# Patient Record
Sex: FEMALE | Race: WHITE | NOT HISPANIC OR LATINO | Employment: OTHER | URBAN - METROPOLITAN AREA
[De-identification: names, ages, dates, MRNs, and addresses within clinical notes are randomized per-mention and may not be internally consistent; named-entity substitution may affect disease eponyms.]

---

## 2018-09-13 ENCOUNTER — HOSPITAL ENCOUNTER (EMERGENCY)
Facility: HOSPITAL | Age: 53
Discharge: HOME | End: 2018-09-13
Attending: EMERGENCY MEDICINE | Admitting: EMERGENCY MEDICINE

## 2018-09-13 ENCOUNTER — APPOINTMENT (OUTPATIENT)
Dept: GENERAL RADIOLOGY | Facility: HOSPITAL | Age: 53
End: 2018-09-13

## 2018-09-13 VITALS
DIASTOLIC BLOOD PRESSURE: 60 MMHG | RESPIRATION RATE: 18 BRPM | OXYGEN SATURATION: 94 % | WEIGHT: 173 LBS | HEART RATE: 72 BPM | TEMPERATURE: 98.3 F | SYSTOLIC BLOOD PRESSURE: 120 MMHG | HEIGHT: 65 IN | BODY MASS INDEX: 28.82 KG/M2

## 2018-09-13 DIAGNOSIS — R74.01 ELEVATED TRANSAMINASE LEVEL: ICD-10-CM

## 2018-09-13 DIAGNOSIS — R07.89 CHEST DISCOMFORT: ICD-10-CM

## 2018-09-13 DIAGNOSIS — R19.7 NAUSEA VOMITING AND DIARRHEA: Primary | ICD-10-CM

## 2018-09-13 DIAGNOSIS — R11.2 NAUSEA VOMITING AND DIARRHEA: Primary | ICD-10-CM

## 2018-09-13 DIAGNOSIS — D75.1 POLYCYTHEMIA: ICD-10-CM

## 2018-09-13 LAB
ALBUMIN SERPL-MCNC: 4.58 G/DL (ref 3.2–4.8)
ALBUMIN/GLOB SERPL: 2 G/DL (ref 1.5–2.5)
ALP SERPL-CCNC: 90 U/L (ref 25–100)
ALT SERPL W P-5'-P-CCNC: 37 U/L (ref 7–40)
ANION GAP SERPL CALCULATED.3IONS-SCNC: 6 MMOL/L (ref 3–11)
AST SERPL-CCNC: 37 U/L (ref 0–33)
BASOPHILS # BLD AUTO: 0.01 10*3/MM3 (ref 0–0.2)
BASOPHILS NFR BLD AUTO: 0.2 % (ref 0–1)
BILIRUB SERPL-MCNC: 0.3 MG/DL (ref 0.3–1.2)
BNP SERPL-MCNC: 12 PG/ML (ref 0–100)
BUN BLD-MCNC: 10 MG/DL (ref 9–23)
BUN/CREAT SERPL: 11.6 (ref 7–25)
CALCIUM SPEC-SCNC: 9.1 MG/DL (ref 8.7–10.4)
CHLORIDE SERPL-SCNC: 102 MMOL/L (ref 99–109)
CO2 SERPL-SCNC: 26 MMOL/L (ref 20–31)
CREAT BLD-MCNC: 0.86 MG/DL (ref 0.6–1.3)
DEPRECATED RDW RBC AUTO: 50 FL (ref 37–54)
EOSINOPHIL # BLD AUTO: 0.1 10*3/MM3 (ref 0–0.3)
EOSINOPHIL NFR BLD AUTO: 1.6 % (ref 0–3)
ERYTHROCYTE [DISTWIDTH] IN BLOOD BY AUTOMATED COUNT: 13.8 % (ref 11.3–14.5)
GFR SERPL CREATININE-BSD FRML MDRD: 69 ML/MIN/1.73
GLOBULIN UR ELPH-MCNC: 2.3 GM/DL
GLUCOSE BLD-MCNC: 107 MG/DL (ref 70–100)
HCT VFR BLD AUTO: 49.1 % (ref 34.5–44)
HGB BLD-MCNC: 17 G/DL (ref 11.5–15.5)
HOLD SPECIMEN: NORMAL
HOLD SPECIMEN: NORMAL
IMM GRANULOCYTES # BLD: 0.01 10*3/MM3 (ref 0–0.03)
IMM GRANULOCYTES NFR BLD: 0.2 % (ref 0–0.6)
LIPASE SERPL-CCNC: 40 U/L (ref 6–51)
LYMPHOCYTES # BLD AUTO: 2.1 10*3/MM3 (ref 0.6–4.8)
LYMPHOCYTES NFR BLD AUTO: 33.3 % (ref 24–44)
MCH RBC QN AUTO: 34.4 PG (ref 27–31)
MCHC RBC AUTO-ENTMCNC: 34.6 G/DL (ref 32–36)
MCV RBC AUTO: 99.4 FL (ref 80–99)
MONOCYTES # BLD AUTO: 0.69 10*3/MM3 (ref 0–1)
MONOCYTES NFR BLD AUTO: 11 % (ref 0–12)
NEUTROPHILS # BLD AUTO: 3.4 10*3/MM3 (ref 1.5–8.3)
NEUTROPHILS NFR BLD AUTO: 53.9 % (ref 41–71)
PLATELET # BLD AUTO: 218 10*3/MM3 (ref 150–450)
PMV BLD AUTO: 10.4 FL (ref 6–12)
POTASSIUM BLD-SCNC: 3.9 MMOL/L (ref 3.5–5.5)
PROT SERPL-MCNC: 6.9 G/DL (ref 5.7–8.2)
RBC # BLD AUTO: 4.94 10*6/MM3 (ref 3.89–5.14)
SODIUM BLD-SCNC: 134 MMOL/L (ref 132–146)
TROPONIN I SERPL-MCNC: 0 NG/ML (ref 0–0.07)
WBC NRBC COR # BLD: 6.3 10*3/MM3 (ref 3.5–10.8)
WHOLE BLOOD HOLD SPECIMEN: NORMAL
WHOLE BLOOD HOLD SPECIMEN: NORMAL

## 2018-09-13 PROCEDURE — 71045 X-RAY EXAM CHEST 1 VIEW: CPT

## 2018-09-13 PROCEDURE — 85025 COMPLETE CBC W/AUTO DIFF WBC: CPT

## 2018-09-13 PROCEDURE — 83690 ASSAY OF LIPASE: CPT | Performed by: EMERGENCY MEDICINE

## 2018-09-13 PROCEDURE — 25010000002 ONDANSETRON PER 1 MG: Performed by: EMERGENCY MEDICINE

## 2018-09-13 PROCEDURE — 80053 COMPREHEN METABOLIC PANEL: CPT | Performed by: EMERGENCY MEDICINE

## 2018-09-13 PROCEDURE — 84484 ASSAY OF TROPONIN QUANT: CPT

## 2018-09-13 PROCEDURE — 96361 HYDRATE IV INFUSION ADD-ON: CPT

## 2018-09-13 PROCEDURE — 96374 THER/PROPH/DIAG INJ IV PUSH: CPT

## 2018-09-13 PROCEDURE — 93005 ELECTROCARDIOGRAM TRACING: CPT

## 2018-09-13 PROCEDURE — 93005 ELECTROCARDIOGRAM TRACING: CPT | Performed by: EMERGENCY MEDICINE

## 2018-09-13 PROCEDURE — 83880 ASSAY OF NATRIURETIC PEPTIDE: CPT

## 2018-09-13 PROCEDURE — 99284 EMERGENCY DEPT VISIT MOD MDM: CPT

## 2018-09-13 RX ORDER — ASPIRIN 81 MG/1
324 TABLET, CHEWABLE ORAL ONCE
Status: DISCONTINUED | OUTPATIENT
Start: 2018-09-13 | End: 2018-09-13

## 2018-09-13 RX ORDER — ATORVASTATIN CALCIUM 10 MG/1
10 TABLET, FILM COATED ORAL DAILY
COMMUNITY

## 2018-09-13 RX ORDER — SODIUM CHLORIDE 0.9 % (FLUSH) 0.9 %
10 SYRINGE (ML) INJECTION AS NEEDED
Status: DISCONTINUED | OUTPATIENT
Start: 2018-09-13 | End: 2018-09-13 | Stop reason: HOSPADM

## 2018-09-13 RX ORDER — ONDANSETRON 4 MG/1
4 TABLET, ORALLY DISINTEGRATING ORAL EVERY 8 HOURS PRN
Qty: 15 TABLET | Refills: 0 | Status: SHIPPED | OUTPATIENT
Start: 2018-09-13

## 2018-09-13 RX ORDER — ESCITALOPRAM OXALATE 20 MG/1
20 TABLET ORAL DAILY
COMMUNITY

## 2018-09-13 RX ORDER — ONDANSETRON 2 MG/ML
4 INJECTION INTRAMUSCULAR; INTRAVENOUS ONCE
Status: COMPLETED | OUTPATIENT
Start: 2018-09-13 | End: 2018-09-13

## 2018-09-13 RX ADMIN — SODIUM CHLORIDE 1000 ML: 9 INJECTION, SOLUTION INTRAVENOUS at 16:39

## 2018-09-13 RX ADMIN — ONDANSETRON 4 MG: 2 INJECTION INTRAMUSCULAR; INTRAVENOUS at 16:40

## 2018-09-13 NOTE — ED PROVIDER NOTES
Josselin Armstrong is a 53 y.o.female who presents to the ED with complaints of chest pain. The patient reports her symptoms began with intermittent nausea and vomiting 5 days ago. Today, she developed chest pain so she went to the [facility] to be evaluated. During her visit at the [facility], it was discovered that she had hypertension so she was sent here for further evaluation. She describes the discomfort in her chest as a constant tightness. She has taken OTC medications, but she has not experienced any relief. She also complains of a cough, diarrhea, shortness of breath, congestion, and a rash on her bilateral knees, but she denies any abdominal pain or headache. She was admitted in 2014 for a small bowel obstruction and intestinal ischemia. She denies any recent sick contacts. She has a history of tobacco abuse, HTD, and depression. She experienced a normal stress test about 2 years ago. There are no other complaints at this time.         History provided by:  Patient, spouse and relative  Chest Pain   Pain location:  L chest  Pain quality: tightness    Pain radiates to:  Does not radiate  Pain severity:  Mild  Onset quality:  Sudden  Duration: chest pain developed today, nausea and vomiting developed 5 days ago.  Timing:  Constant  Progression:  Unchanged  Chronicity:  New  Relieved by:  None tried  Worsened by:  Nothing  Ineffective treatments: OTC medications.  Associated symptoms: cough, nausea, shortness of breath and vomiting    Associated symptoms: no abdominal pain and no headache    Risk factors: high cholesterol    Risk factors: not male        Review of Systems   HENT: Positive for congestion.    Respiratory: Positive for cough and shortness of breath.    Cardiovascular: Positive for chest pain.   Gastrointestinal: Positive for diarrhea, nausea and vomiting. Negative for abdominal pain.   Skin: Positive for rash (bilateral knees).   Neurological: Negative for headaches.   All other systems reviewed and  are negative.      Past Medical History:   Diagnosis Date   • Depression    • Hyperlipidemia        No Known Allergies    Past Surgical History:   Procedure Laterality Date   • ABDOMINAL SURGERY     • TONSILLECTOMY         Family History   Problem Relation Age of Onset   • Breast cancer Mother 65   • Ovarian cancer Neg Hx        Social History     Social History   • Marital status:      Social History Main Topics   • Smoking status: Current Every Day Smoker     Packs/day: 1.00     Years: 10.00     Types: Cigarettes   • Alcohol use Yes      Comment: 2 drinks/day, wine, bourbon, vodka   • Drug use: No     Other Topics Concern   • Not on file         Objective   Physical Exam   Constitutional: She is oriented to person, place, and time. She appears well-developed and well-nourished. No distress.   HENT:   Head: Normocephalic and atraumatic.   Right Ear: External ear normal.   Nose: Nose normal.   Dry lips. Fluid behind the left TM with no signs of infection.    Eyes: Pupils are equal, round, and reactive to light. Conjunctivae and EOM are normal. No scleral icterus.   Neck: Normal range of motion. Neck supple.   Cardiovascular: Normal rate, regular rhythm and normal heart sounds.    No murmur heard.  Pulmonary/Chest: Effort normal and breath sounds normal. No respiratory distress.   Abdominal: Soft. Bowel sounds are normal. There is no tenderness.   Musculoskeletal: Normal range of motion. She exhibits no edema.   Lymphadenopathy:     She has no cervical adenopathy.   Neurological: She is alert and oriented to person, place, and time.   Skin: Skin is warm and dry. Rash noted.   Appears to be livedo reticularis on her knees bilaterally.    Psychiatric: She has a normal mood and affect. Her behavior is normal.   Nursing note and vitals reviewed.      Procedures         ED Course  ED Course as of Sep 14 0123   Thu Sep 13, 2018   1719 Dr. Childs is bedside reevaluating the patient and discussing the treatment  plan.   [TB]      ED Course User Index  [TB] Jorge L Craft                     [facility]  Number of Diagnoses or Management Options  Chest discomfort:   Elevated transaminase level:   Nausea vomiting and diarrhea:   Polycythemia:   Diagnosis management comments:       Reviewed all available studies the bedside with the patient and her family.  They are reassuring.  She is some mild laboratory abnormalities including mild polycythemia.  She is a smoker I've encouraged her to quit smoking.  Her glucose is slightly elevated as is one of her transaminases.  On recheck her abdomen is benign.  I postulated this may be a viral illness.  Talked to the patient about dietary issues.  She's had a stress test about 2 years ago that per her report was normal think her risk therefore of this being anything coronary artery related is low.  Encourage follow-up with her primary care doctor early next week to discuss this visit for recheck.  She'll return to the ER if worse in any way.  Wrote her some Zofran to take for nausea.    All are agreeable with the plan       Amount and/or Complexity of Data Reviewed  Clinical lab tests: reviewed  Tests in the radiology section of CPT®: reviewed  Tests in the medicine section of CPT®: reviewed        Final diagnoses:   Nausea vomiting and diarrhea   Chest discomfort   Polycythemia   Elevated transaminase level       Documentation assistance provided by margareth Craft.  Information recorded by the margareth was done at my direction and has been verified and validated by me.     Jorge L Craft  09/13/18 1708       Jorge L Craft  09/13/18 2050       Jay Childs MD  09/14/18 [id number]

## 2022-03-02 ENCOUNTER — TRANSCRIBE ORDERS (OUTPATIENT)
Dept: ADMINISTRATIVE | Facility: HOSPITAL | Age: 57
End: 2022-03-02

## 2022-03-02 DIAGNOSIS — Z12.31 VISIT FOR SCREENING MAMMOGRAM: Primary | ICD-10-CM

## 2022-04-05 ENCOUNTER — HOSPITAL ENCOUNTER (OUTPATIENT)
Dept: MAMMOGRAPHY | Facility: HOSPITAL | Age: 57
Discharge: HOME | End: 2022-04-05
Admitting: FAMILY MEDICINE

## 2022-04-05 DIAGNOSIS — Z12.31 VISIT FOR SCREENING MAMMOGRAM: ICD-10-CM

## 2022-04-05 PROCEDURE — 77063 BREAST TOMOSYNTHESIS BI: CPT | Performed by: RADIOLOGY

## 2022-04-05 PROCEDURE — 77067 SCR MAMMO BI INCL CAD: CPT

## 2022-04-05 PROCEDURE — 77063 BREAST TOMOSYNTHESIS BI: CPT

## 2022-04-05 PROCEDURE — 77067 SCR MAMMO BI INCL CAD: CPT | Performed by: RADIOLOGY

## 2023-04-18 ENCOUNTER — HOSPITAL ENCOUNTER (OUTPATIENT)
Facility: HOSPITAL | Age: 58
Setting detail: OBSERVATION
Discharge: HOME | End: 2023-04-21
Attending: EMERGENCY MEDICINE | Admitting: HOSPITALIST
Payer: COMMERCIAL

## 2023-04-18 ENCOUNTER — APPOINTMENT (OUTPATIENT)
Dept: ULTRASOUND IMAGING | Facility: HOSPITAL | Age: 58
End: 2023-04-18
Payer: COMMERCIAL

## 2023-04-18 ENCOUNTER — APPOINTMENT (OUTPATIENT)
Dept: CT IMAGING | Facility: HOSPITAL | Age: 58
End: 2023-04-18
Payer: COMMERCIAL

## 2023-04-18 ENCOUNTER — APPOINTMENT (OUTPATIENT)
Dept: GENERAL RADIOLOGY | Facility: HOSPITAL | Age: 58
End: 2023-04-18
Payer: COMMERCIAL

## 2023-04-18 DIAGNOSIS — R20.0 NUMBNESS AND TINGLING OF RIGHT UPPER EXTREMITY: Primary | ICD-10-CM

## 2023-04-18 DIAGNOSIS — I10 HYPERTENSION, UNSPECIFIED TYPE: ICD-10-CM

## 2023-04-18 DIAGNOSIS — E87.6 HYPOKALEMIA: ICD-10-CM

## 2023-04-18 DIAGNOSIS — R20.2 NUMBNESS AND TINGLING OF RIGHT UPPER EXTREMITY: Primary | ICD-10-CM

## 2023-04-18 PROBLEM — R03.0 ELEVATED BLOOD-PRESSURE READING WITHOUT DIAGNOSIS OF HYPERTENSION: Status: ACTIVE | Noted: 2023-04-18

## 2023-04-18 PROBLEM — K21.9 GERD (GASTROESOPHAGEAL REFLUX DISEASE): Status: ACTIVE | Noted: 2023-04-18

## 2023-04-18 PROBLEM — R79.89 ELEVATED LFTS: Status: ACTIVE | Noted: 2023-04-18

## 2023-04-18 PROBLEM — E78.5 HLD (HYPERLIPIDEMIA): Status: ACTIVE | Noted: 2023-04-18

## 2023-04-18 LAB
ALBUMIN SERPL-MCNC: 3.9 G/DL (ref 3.5–5.2)
ALBUMIN/GLOB SERPL: 1.2 G/DL
ALP SERPL-CCNC: 213 U/L (ref 39–117)
ALT SERPL W P-5'-P-CCNC: 107 U/L (ref 1–33)
ANION GAP SERPL CALCULATED.3IONS-SCNC: 19 MMOL/L (ref 5–15)
APTT PPP: 20 SECONDS (ref 22–39)
AST SERPL-CCNC: 124 U/L (ref 1–32)
BASOPHILS # BLD AUTO: 0.06 10*3/MM3 (ref 0–0.2)
BASOPHILS NFR BLD AUTO: 0.6 % (ref 0–1.5)
BILIRUB SERPL-MCNC: 0.6 MG/DL (ref 0–1.2)
BUN BLDA-MCNC: 7 MG/DL (ref 8–26)
BUN SERPL-MCNC: 7 MG/DL (ref 6–20)
BUN/CREAT SERPL: 9.2 (ref 7–25)
CA-I BLDA-SCNC: 1.06 MMOL/L (ref 1.2–1.32)
CALCIUM SPEC-SCNC: 8.7 MG/DL (ref 8.6–10.5)
CHLORIDE BLDA-SCNC: 94 MMOL/L (ref 98–109)
CHLORIDE SERPL-SCNC: 96 MMOL/L (ref 98–107)
CO2 BLDA-SCNC: 32 MMOL/L (ref 24–29)
CO2 SERPL-SCNC: 27 MMOL/L (ref 22–29)
CREAT BLDA-MCNC: 0.7 MG/DL (ref 0.6–1.3)
CREAT SERPL-MCNC: 0.76 MG/DL (ref 0.57–1)
DEPRECATED RDW RBC AUTO: 51.9 FL (ref 37–54)
EGFRCR SERPLBLD CKD-EPI 2021: 101 ML/MIN/1.73
EGFRCR SERPLBLD CKD-EPI 2021: 91.5 ML/MIN/1.73
EOSINOPHIL # BLD AUTO: 0.13 10*3/MM3 (ref 0–0.4)
EOSINOPHIL NFR BLD AUTO: 1.3 % (ref 0.3–6.2)
ERYTHROCYTE [DISTWIDTH] IN BLOOD BY AUTOMATED COUNT: 14.3 % (ref 12.3–15.4)
GLOBULIN UR ELPH-MCNC: 3.3 GM/DL
GLUCOSE BLDC GLUCOMTR-MCNC: 126 MG/DL (ref 70–130)
GLUCOSE SERPL-MCNC: 125 MG/DL (ref 65–99)
HAV IGM SERPL QL IA: NORMAL
HBV CORE IGM SERPL QL IA: NORMAL
HBV SURFACE AG SERPL QL IA: NORMAL
HCT VFR BLD AUTO: 43.5 % (ref 34–46.6)
HCT VFR BLDA CALC: 45 % (ref 38–51)
HCV AB SER DONR QL: NORMAL
HGB BLD-MCNC: 14.6 G/DL (ref 12–15.9)
HGB BLDA-MCNC: 15.3 G/DL (ref 12–17)
HOLD SPECIMEN: NORMAL
IMM GRANULOCYTES # BLD AUTO: 0.04 10*3/MM3 (ref 0–0.05)
IMM GRANULOCYTES NFR BLD AUTO: 0.4 % (ref 0–0.5)
INR PPP: 1.1 (ref 0.8–1.2)
LIPASE SERPL-CCNC: 24 U/L (ref 13–60)
LYMPHOCYTES # BLD AUTO: 1.93 10*3/MM3 (ref 0.7–3.1)
LYMPHOCYTES NFR BLD AUTO: 19.6 % (ref 19.6–45.3)
MAGNESIUM SERPL-MCNC: 1.8 MG/DL (ref 1.6–2.6)
MCH RBC QN AUTO: 33.2 PG (ref 26.6–33)
MCHC RBC AUTO-ENTMCNC: 33.6 G/DL (ref 31.5–35.7)
MCV RBC AUTO: 98.9 FL (ref 79–97)
MONOCYTES # BLD AUTO: 0.81 10*3/MM3 (ref 0.1–0.9)
MONOCYTES NFR BLD AUTO: 8.2 % (ref 5–12)
NEUTROPHILS NFR BLD AUTO: 6.86 10*3/MM3 (ref 1.7–7)
NEUTROPHILS NFR BLD AUTO: 69.9 % (ref 42.7–76)
NRBC BLD AUTO-RTO: 0 /100 WBC (ref 0–0.2)
PLATELET # BLD AUTO: 329 10*3/MM3 (ref 140–450)
PMV BLD AUTO: 9.5 FL (ref 6–12)
POTASSIUM BLDA-SCNC: 3 MMOL/L (ref 3.5–4.9)
POTASSIUM SERPL-SCNC: 3.3 MMOL/L (ref 3.5–5.2)
PROT SERPL-MCNC: 7.2 G/DL (ref 6–8.5)
PROTHROMBIN TIME: 13.4 SECONDS (ref 12.8–15.2)
QT INTERVAL: 474 MS
QTC INTERVAL: 560 MS
RBC # BLD AUTO: 4.4 10*6/MM3 (ref 3.77–5.28)
SODIUM BLD-SCNC: 138 MMOL/L (ref 138–146)
SODIUM SERPL-SCNC: 142 MMOL/L (ref 136–145)
TROPONIN T SERPL HS-MCNC: <6 NG/L
WBC NRBC COR # BLD: 9.83 10*3/MM3 (ref 3.4–10.8)
WHOLE BLOOD HOLD COAG: NORMAL
WHOLE BLOOD HOLD SPECIMEN: NORMAL

## 2023-04-18 PROCEDURE — 99285 EMERGENCY DEPT VISIT HI MDM: CPT

## 2023-04-18 PROCEDURE — 85610 PROTHROMBIN TIME: CPT

## 2023-04-18 PROCEDURE — 83690 ASSAY OF LIPASE: CPT | Performed by: NURSE PRACTITIONER

## 2023-04-18 PROCEDURE — 85025 COMPLETE CBC W/AUTO DIFF WBC: CPT | Performed by: EMERGENCY MEDICINE

## 2023-04-18 PROCEDURE — 99222 1ST HOSP IP/OBS MODERATE 55: CPT | Performed by: NURSE PRACTITIONER

## 2023-04-18 PROCEDURE — 80074 ACUTE HEPATITIS PANEL: CPT | Performed by: NURSE PRACTITIONER

## 2023-04-18 PROCEDURE — G0378 HOSPITAL OBSERVATION PER HR: HCPCS

## 2023-04-18 PROCEDURE — 80047 BASIC METABLC PNL IONIZED CA: CPT

## 2023-04-18 PROCEDURE — 85730 THROMBOPLASTIN TIME PARTIAL: CPT | Performed by: EMERGENCY MEDICINE

## 2023-04-18 PROCEDURE — 25510000001 IOPAMIDOL PER 1 ML: Performed by: EMERGENCY MEDICINE

## 2023-04-18 PROCEDURE — 84484 ASSAY OF TROPONIN QUANT: CPT | Performed by: EMERGENCY MEDICINE

## 2023-04-18 PROCEDURE — 70450 CT HEAD/BRAIN W/O DYE: CPT

## 2023-04-18 PROCEDURE — 99223 1ST HOSP IP/OBS HIGH 75: CPT | Performed by: NURSE PRACTITIONER

## 2023-04-18 PROCEDURE — 36415 COLL VENOUS BLD VENIPUNCTURE: CPT

## 2023-04-18 PROCEDURE — 85014 HEMATOCRIT: CPT

## 2023-04-18 PROCEDURE — 70498 CT ANGIOGRAPHY NECK: CPT

## 2023-04-18 PROCEDURE — 0042T HC CT CEREBRAL PERFUSION W/WO CONTRAST: CPT

## 2023-04-18 PROCEDURE — 80053 COMPREHEN METABOLIC PANEL: CPT | Performed by: NURSE PRACTITIONER

## 2023-04-18 PROCEDURE — 70496 CT ANGIOGRAPHY HEAD: CPT

## 2023-04-18 PROCEDURE — 93005 ELECTROCARDIOGRAM TRACING: CPT | Performed by: EMERGENCY MEDICINE

## 2023-04-18 PROCEDURE — 71045 X-RAY EXAM CHEST 1 VIEW: CPT

## 2023-04-18 PROCEDURE — 83735 ASSAY OF MAGNESIUM: CPT | Performed by: EMERGENCY MEDICINE

## 2023-04-18 RX ORDER — ASPIRIN 325 MG
325 TABLET ORAL DAILY
Status: DISCONTINUED | OUTPATIENT
Start: 2023-04-18 | End: 2023-04-20

## 2023-04-18 RX ORDER — SODIUM CHLORIDE 0.9 % (FLUSH) 0.9 %
10 SYRINGE (ML) INJECTION AS NEEDED
Status: DISCONTINUED | OUTPATIENT
Start: 2023-04-18 | End: 2023-04-19

## 2023-04-18 RX ORDER — POTASSIUM CHLORIDE 750 MG/1
40 CAPSULE, EXTENDED RELEASE ORAL ONCE
Status: COMPLETED | OUTPATIENT
Start: 2023-04-18 | End: 2023-04-18

## 2023-04-18 RX ORDER — ESOMEPRAZOLE MAGNESIUM 40 MG/1
40 CAPSULE, DELAYED RELEASE ORAL
COMMUNITY

## 2023-04-18 RX ORDER — ATORVASTATIN CALCIUM 40 MG/1
80 TABLET, FILM COATED ORAL NIGHTLY
Status: CANCELLED | OUTPATIENT
Start: 2023-04-18

## 2023-04-18 RX ORDER — CLONIDINE HYDROCHLORIDE 0.1 MG/1
0.1 TABLET ORAL ONCE
Status: COMPLETED | OUTPATIENT
Start: 2023-04-18 | End: 2023-04-18

## 2023-04-18 RX ORDER — ASPIRIN 300 MG/1
300 SUPPOSITORY RECTAL DAILY
Status: DISCONTINUED | OUTPATIENT
Start: 2023-04-18 | End: 2023-04-20

## 2023-04-18 RX ADMIN — POTASSIUM CHLORIDE 40 MEQ: 750 CAPSULE, EXTENDED RELEASE ORAL at 16:16

## 2023-04-18 RX ADMIN — IOPAMIDOL 115 ML: 755 INJECTION, SOLUTION INTRAVENOUS at 15:18

## 2023-04-18 RX ADMIN — ASPIRIN 325 MG: 325 TABLET ORAL at 16:16

## 2023-04-18 RX ADMIN — CLONIDINE HYDROCHLORIDE 0.1 MG: 0.1 TABLET ORAL at 15:33

## 2023-04-18 NOTE — CONSULTS
"Stroke Consult Note    Patient Name: Radha Armstrong   MRN: [id number]  Age: 57 y.o.  Sex: female  : 1965    Primary Care Physician: Franci Vaughn MD  Referring Physician: Dr. Flanagan  TIME STROKE TEAM CALLED: 1450 EST     TIME PATIENT SEEN: 1500 EST    Handedness: Right   Race:     Chief Complaint/Reason for Consultation: Transient head pressure, right hand numbness, \"all over shaking\", and coordination difficulties    HPI: Radha Armstrong is a 57-year-old female with a PMH significant for HLD, depression, former tobacco abuse, and anxiety who presents to [facility] ED with complaints of episodes of transient head pressure, right hand numbness, \"all over shaking\", and coordination difficulties that have been occurring off and on for the past 2 weeks.  Patient reports that she noted symptoms when she woke this morning at 0800.  Her last known well was 2200 last night.  Symptoms have currently improved.  Continues to have mild numbness in her right hand.  Patient reports that episodes occur when changing positions.  When standing, she states that she has extreme head pressure followed by shaking all over and difficulty with coordination.  This has happened daily for the last 2 weeks.  She has not sought any medical care.  Her family reports that she had a syncopal episode with fall.  She had no evidence of see with this event.      Her BP is noted to be elevated today at 196/92.  Patient reports no known history of hypertension. GCS is 15.  NIH is 1.  Mild tremor noted when lifting her LLE.  CT head negative for any acute abnormalities.  CTP with normal brain perfusion.CTA H/N with no flow-limiting stenosis, occlusion, or aneurysm noted.      Last Known Normal Date/Time: Symptoms currently resolved    Review of Systems   Constitutional: Negative for chills and fever.   HENT: Negative for congestion and trouble swallowing.    Eyes: Negative for photophobia and visual disturbance.   Respiratory: Negative for cough " and shortness of breath.    Cardiovascular: Negative for chest pain and palpitations.   Gastrointestinal: Negative for diarrhea, nausea and vomiting.   Musculoskeletal: Positive for gait problem.   Neurological: Positive for tremors, numbness and headaches.      Past Medical History:   Diagnosis Date   • Depression    • Hyperlipidemia      Past Surgical History:   Procedure Laterality Date   • ABDOMINAL SURGERY     • TONSILLECTOMY       Family History   Problem Relation Age of Onset   • Breast cancer Mother 65   • Ovarian cancer Neg Hx      Social History     Socioeconomic History   • Marital status:    Tobacco Use   • Smoking status: Every Day     Packs/day: 1.00     Years: 10.00     Pack years: 10.00     Types: Cigarettes   Substance and Sexual Activity   • Alcohol use: Yes     Comment: 2 drinks/day, wine, bourbon, vodka   • Drug use: No     No Known Allergies  Prior to Admission medications    Medication Sig Start Date End Date Taking? Authorizing Provider   atorvastatin (LIPITOR) 10 MG tablet Take 10 mg by mouth Daily.    Provider, MD Tyrell   escitalopram (LEXAPRO) 20 MG tablet Take 20 mg by mouth Daily.    Provider, MD Tyrell   ondansetron ODT (ZOFRAN-ODT) 4 MG disintegrating tablet Take 1 tablet by mouth Every 8 (Eight) Hours As Needed for Nausea or Vomiting. 9/13/18   Jay Childs MD         Temp:  [98.3 °F (36.8 °C)] 98.3 °F (36.8 °C)  Heart Rate:  [95] 95  Resp:  [18] 18  BP: (196)/(92) 196/92  Neurological Exam  Mental Status  Alert. Oriented to person, place, time and situation. Oriented to person, place, and time. Speech is normal. Language is fluent with no aphasia.    Cranial Nerves  CN II: Visual acuity is normal. Visual fields full to confrontation.  CN III, IV, VI: Extraocular movements intact bilaterally. Normal lids and orbits bilaterally. Pupils equal round and reactive to light bilaterally.  CN V: Facial sensation is normal.  CN VII: Full and symmetric facial movement.  CN  IX, X: Palate elevates symmetrically  CN XI: Shoulder shrug strength is normal.  CN XII: Tongue midline without atrophy or fasciculations.    Motor   Strength is 5/5 throughout all four extremities.  Slight tremor were noted when lifting LLE.    Sensory  Light touch abnormality: Right hand.     Reflexes                                            Right                      Left  Patellar                                2+                         2+  Plantar                           Downgoing                Downgoing    Coordination  Right: Finger-to-nose normal. Heel-to-shin normal.Left: Finger-to-nose normal.    Gait   Normal gait.      Physical Exam  Constitutional:       Appearance: She is obese.   HENT:      Head: Normocephalic and atraumatic.   Eyes:      General: Lids are normal.      Extraocular Movements: Extraocular movements intact.      Pupils: Pupils are equal, round, and reactive to light.   Cardiovascular:      Rate and Rhythm: Normal rate and regular rhythm.   Pulmonary:      Effort: Pulmonary effort is normal. No respiratory distress.   Abdominal:      General: There is no distension.      Palpations: Abdomen is soft.   Musculoskeletal:         General: Normal range of motion.      Cervical back: Normal range of motion and neck supple.   Skin:     General: Skin is warm and dry.      Capillary Refill: Capillary refill takes less than 2 seconds.   Neurological:      Mental Status: She is alert and oriented to person, place, and time.      Cranial Nerves: No cranial nerve deficit.      Sensory: No sensory deficit.      Motor: Motor strength is normal. No weakness.      Coordination: Coordination normal.      Gait: Gait normal.      Deep Tendon Reflexes:      Reflex Scores:       Patellar reflexes are 2+ on the right side and 2+ on the left side.  Psychiatric:         Mood and Affect: Mood normal.         Speech: Speech normal.         Behavior: Behavior normal.         Acute Stroke Data    Thrombolytic  Inclusion / Exclusion Criteria    Time: 1505 EDT  Person Administering Scale: SHIN Alas    Inclusion Criteria  [x]   18 years of age or greater   []   Onset of symptoms < 4.5 hours before beginning treatment (stroke onset = time patient was last seen well or without symptoms).   []   Diagnosis of acute ischemic stroke causing measurable disabling deficit (Complete Hemianopia, Any Aphasia, Visual or Sensory Extinction, Any weakness limiting sustained effort against gravity)   []   Any remaining deficit considered potentially disabling in view of patient and practitioner   Exclusion criteria (Do not proceed with Alteplase if any are checked under exclusion criteria)  [x]   Onset unknown or GREATER than 4.5 hours   []   ICH on CT/MRI   []   CT demonstrates hypodensity representing acute or subacute infarct   []   Significant head trauma or prior stroke in the previous 3 months   []   Symptoms suggestive of subarachnoid hemorrhage   []   History of un-ruptured intracranial aneurysm GREATER than 10 mm   []   Recent intracranial or intraspinal surgery within the last 3 months   []   Arterial puncture at a non-compressible site in the previous 7 days   []   Active internal bleeding   []   Acute bleeding tendency   []   Platelet count LESS than 100,000 for known hematological diseases such as leukemia, thrombocytopenia or chronic cirrhosis   []   Current use of anticoagulant with INR GREATER than 1.7 or PT GREATER than 15 seconds, aPTT GREATER than 40 seconds   []   Heparin received within 48 hours, resulting in abnormally elevated aPTT GREATER than upper limit of normal   []   Current use of direct thrombin inhibitors or direct factor Xa inhibitors in the past 48 hours   []   Elevated blood pressure refractory to treatment (systolic GREATER than 185 mm/Hg or diastolic  GREATER than 110 mm/Hg   []   Suspected infective endocarditis and aortic arch dissection   []   Current use of therapeutic treatment dose  of low-molecular-weight heparin (LMWH) within the previous 24 hours   []   Structural GI malignancy or bleed   Relative exclusion for all patients  []   Only minor non-disabling symptoms   []   Pregnancy   []   Seizure at onset with postictal residual neurological impairments   []   Major surgery or previous trauma within past 14 days   []   History of previous spontaneous ICH, intracranial neoplasm, or AV malformation   []   Postpartum (within previous 14 days)   []   Recent GI or urinary tract hemorrhage (within previous 21 days)   []   Recent acute MI (within previous 3 months)   []   History of un-ruptured intracranial aneurysm LESS than 10 mm   []   History of ruptured intracranial aneurysm   []   Blood glucose LESS than 50 mg/dL (2.7 mmol/L)   []   Dural puncture within the last 7 days   []   Known GREATER than 10 cerebral microbleeds   Additional exclusions for patients with symptoms onset between 3 and 4.5 hours.  []   Age > 80.   []   On any anticoagulants regardless of INR  >>> Warfarin (Coumadin), Heparin, Enoxaparin (Lovenox), fondaparinux (Arixtra), bivalirudin (Angiomax), Argatroban, dabigatran (Pradaxa), rivaroxaban (Xarelto), or apixaban (Eliquis)   []   Severe stroke (NIHSS > 25).   []   History of BOTH diabetes and previous ischemic stroke.   []   The risks and benefits have been discussed with the patient or family related to the administration of IV thrombolytic therapy for stroke symptoms.   []   I have discussed and reviewed the patient's case and imaging with the attending prior to IV thrombolytic therapy.    Time IV thrombolytic administered       Hospital Meds:  Scheduled- cloNIDine, 0.1 mg, Oral, Once      Infusions-     PRNs- •  sodium chloride    Functional Status Prior to Current Stroke/[place] Score: 0    NIH Stroke Scale  Time: 1505  Person Administering Scale: SHIN Alas    1a  Level of consciousness: 0=alert; keenly responsive   1b. LOC questions:  0=Answers both  questions correctly   1c. LOC commands: 0=Performs both tasks correctly   2.  Best Gaze: 0=normal   3.  Visual: 0=No visual loss   4. Facial Palsy: 0=Normal symmetric movement   5a.  Motor left arm: 0=No drift, limb holds 90 (or 45) degrees for full 10 seconds   5b.  Motor right arm: 0=No drift, limb holds 90 (or 45) degrees for full 10 seconds   6a. motor left le=No drift, limb holds 90 (or 45) degrees for full 10 seconds   6b  Motor right le=No drift, limb holds 90 (or 45) degrees for full 10 seconds   7. Limb Ataxia: 0=Absent   8.  Sensory: 1=Mild to moderate sensory loss; patient feels pinprick is less sharp or is dull on the affected side; there is a loss of superficial pain with pinprick but patient is aware She is being touched   9. Best Language:  0=No aphasia, normal   10. Dysarthria: 0=Normal   11. Extinction and Inattention: 0=No abnormality    Total:   1       Results Reviewed:  I have personally reviewed current lab, radiology, and data.    CT Angiogram Neck    Result Date: 2023  Impression: 1. No intra or extracranial stenoses, aneurysms, or occlusions are identified. Electronically Signed: Bernabe Escobar  2023 3:35 PM EDT  Workstation ID: [id number]    XR Chest 1 View    Result Date: 2023  Impression: No active cardiopulmonary disease Electronically Signed: Bhavesh Toth  2023 3:40 PM EDT  Workstation ID: [id number]    CT Head Without Contrast Stroke Protocol    Result Date: 2023  Impression: No evidence of acute intracranial hemorrhage or large territory infarct. Electronically Signed: Conrad Hwang  2023 3:21 PM EDT  Workstation ID: [id number]    CT Angiogram Head w AI Analysis of LVO    Result Date: 2023  Impression: 1. No intra or extracranial stenoses, aneurysms, or occlusions are identified. Electronically Signed: Bernabe Escobar  2023 3:35 PM EDT  Workstation ID: [id number]    CT CEREBRAL PERFUSION WITH & WITHOUT CONTRAST    Result Date:  4/18/2023  Impression: Examination appears within normal limits. Electronically Signed: Vicente Abhishek  4/18/2023 3:29 PM EDT  Workstation ID: [id number]    WBC   Date Value Ref Range Status   04/18/2023 9.83 3.40 - 10.80 10*3/mm3 Final     RBC   Date Value Ref Range Status   04/18/2023 4.40 3.77 - 5.28 10*6/mm3 Final     Hemoglobin   Date Value Ref Range Status   04/18/2023 14.6 12.0 - 15.9 g/dL Final   04/18/2023 15.3 12.0 - 17.0 g/dL Final     Comment:     Serial Number: [id number]:  [id number]     Hematocrit   Date Value Ref Range Status   04/18/2023 43.5 34.0 - 46.6 % Final   04/18/2023 45 38 - 51 % Final     MCV   Date Value Ref Range Status   04/18/2023 98.9 (H) 79.0 - 97.0 fL Final     MCH   Date Value Ref Range Status   04/18/2023 33.2 (H) 26.6 - 33.0 pg Final     MCHC   Date Value Ref Range Status   04/18/2023 33.6 31.5 - 35.7 g/dL Final     RDW   Date Value Ref Range Status   04/18/2023 14.3 12.3 - 15.4 % Final     RDW-SD   Date Value Ref Range Status   04/18/2023 51.9 37.0 - 54.0 fl Final     MPV   Date Value Ref Range Status   04/18/2023 9.5 6.0 - 12.0 fL Final     Platelets   Date Value Ref Range Status   04/18/2023 329 140 - 450 10*3/mm3 Final     Neutrophil %   Date Value Ref Range Status   04/18/2023 69.9 42.7 - 76.0 % Final     Lymphocyte %   Date Value Ref Range Status   04/18/2023 19.6 19.6 - 45.3 % Final     Monocyte %   Date Value Ref Range Status   04/18/2023 8.2 5.0 - 12.0 % Final     Eosinophil %   Date Value Ref Range Status   04/18/2023 1.3 0.3 - 6.2 % Final     Basophil %   Date Value Ref Range Status   04/18/2023 0.6 0.0 - 1.5 % Final     Immature Grans %   Date Value Ref Range Status   04/18/2023 0.4 0.0 - 0.5 % Final     Neutrophils, Absolute   Date Value Ref Range Status   04/18/2023 6.86 1.70 - 7.00 10*3/mm3 Final     Lymphocytes, Absolute   Date Value Ref Range Status   04/18/2023 1.93 0.70 - 3.10 10*3/mm3 Final     Monocytes, Absolute   Date Value Ref Range Status   04/18/2023 0.81  "0.10 - 0.90 10*3/mm3 Final     Eosinophils, Absolute   Date Value Ref Range Status   04/18/2023 0.13 0.00 - 0.40 10*3/mm3 Final     Basophils, Absolute   Date Value Ref Range Status   04/18/2023 0.06 0.00 - 0.20 10*3/mm3 Final     Immature Grans, Absolute   Date Value Ref Range Status   04/18/2023 0.04 0.00 - 0.05 10*3/mm3 Final     nRBC   Date Value Ref Range Status   04/18/2023 0.0 0.0 - 0.2 /100 WBC Final     Lab Results   Component Value Date    GLUCOSE 125 (H) 04/18/2023    BUN 7 04/18/2023    CREATININE 0.76 04/18/2023    EGFR 91.5 04/18/2023    BCR 9.2 04/18/2023    K 3.3 (L) 04/18/2023    CO2 27.0 04/18/2023    CALCIUM 8.7 04/18/2023    ALBUMIN 3.9 04/18/2023    BILITOT 0.6 04/18/2023     (H) 04/18/2023     (H) 04/18/2023         Assessment/Plan:    57-year-old female with a PMH significant for HLD, depression, former tobacco abuse, and anxiety who presents to [facility] ED with complaints of episodes of transient head pressure, right hand numbness, \"all over shaking\", and coordination difficulties that have been occurring off and on for the past 2 weeks.  Her last known well was 2200 last night.  She is not a TNK candidate secondary to last known well greater than 4.5 hours.  She is not a candidate for intervention secondary to no LVO noted.  She will be admitted for further evaluation.    Advanced imaging:  -CT head negative for any acute abnormalities.    -CTP with normal brain perfusion.  -CTA H/N with no flow-limiting stenosis, occlusion, or aneurysm noted.    Antiplatelet PTA: None  Anticoagulant PTA: None      Transient neurologic symptoms, Right hand numbness  - Possible TIA, stroke, or hypertensive in etiology  - TIA/ischemic stroke order set without thrombolytic therapy  - NIH 1; continues with right hand numbness.  - MRI brain pending.  Consider MRI C-spine if MRI brain is negative for AIS as patient did have a recent fall.   -TTE pending  - Aspirin  - HTN; BP goals less than 180. Patient " with no known history of hypertension. Systolic blood pressure 196 on arrival today. Management per primary team  - HLD; elevated LFTs. Hold statin therapy for now.  Lipid panel in the a.m.  - Hemoglobin A1c in the a.m.  - Consider EEG  - Activity as tolerated  - Fall precautions  - N.p.o.  - Plan discussed with the patient, her , Dr. Flanagan, and nursing staff.  Stroke neurology will continue to follow.  Please call with any questions or concerns.      SHIN Alas, AGACNP-BC  April 18, 2023  15:17 EDT

## 2023-04-18 NOTE — ED PROVIDER NOTES
"Subjective   History of Present Illness  57-year-old female with no known prior history of hypertension, reports episodes of intermittent \"strong pressure\" in her head which is holocephalic, and followed by her whole body shaking.  She denies any loss of consciousness during these episodes, however the shaking caused her to fall 3 days ago.  She denies any loss of consciousness when she fell 3 days ago.  She states that she did not hit her head at that time.  She states that the episodes had occurred infrequently for quite some time, but have now been occurring daily for the past 1 to 2 weeks.  She went to bed around 10 PM last night in her usual state of health, but woke up this morning at around 8 AM with right upper extremity numbness.  She reports that this is tingling sensation from the right elbow distally.  It involves all the fingertips per the patient.  She denies headache currently.  She denies recent chest discomfort.  She has a prior history of tobacco use but quit smoking approximately 1 year ago.  She denies any numbness to the right lower extremity.  She denies any acute extremity weakness.            Past Medical History:   Diagnosis Date   • Depression    • Hyperlipidemia        No Known Allergies    Past Surgical History:   Procedure Laterality Date   • ABDOMINAL SURGERY     • TONSILLECTOMY         Family History   Problem Relation Age of Onset   • Breast cancer Mother 65   • Ovarian cancer Neg Hx        Social History     Socioeconomic History   • Marital status:    Tobacco Use   • Smoking status: Every Day     Packs/day: 1.00     Years: 10.00     Pack years: 10.00     Types: Cigarettes   Substance and Sexual Activity   • Alcohol use: Yes     Comment: 2 drinks/day, wine, bourbon, vodka   • Drug use: No           Objective   Physical Exam  Vitals and nursing note reviewed.   Constitutional:       General: She is not in acute distress.     Appearance: She is not diaphoretic.   HENT:      " Mouth/Throat:      Mouth: Mucous membranes are moist.   Eyes:      General: No scleral icterus.     Extraocular Movements: Extraocular movements intact.      Pupils: Pupils are equal, round, and reactive to light.      Comments: No photophobia or nystagmus.   Neck:      Comments: No meningismus.  Cardiovascular:      Rate and Rhythm: Normal rate and regular rhythm.      Heart sounds: No murmur heard.    No friction rub. No gallop.      Comments: S1-S2  Pulmonary:      Effort: Pulmonary effort is normal.      Breath sounds: Normal breath sounds. No stridor. No wheezing, rhonchi or rales.   Musculoskeletal:         General: No deformity.      Cervical back: Neck supple.      Comments: No significant peripheral edema.  Right upper extremity 2+ radial pulse.   Skin:     General: Skin is warm and dry.      Coloration: Skin is not cyanotic.   Neurological:      Mental Status: She is alert.      Comments: Normal speech, no dysarthria.  Normal gait, no ataxia.  No facial droop.  Motor 5 out of 5 power x4 extremities, symmetric.  Sensation grossly intact to light touch.  She states that the distal right upper extremity she is able to feel me touching it, but it does not feel completely normal.  No dysmetria or past-pointing on finger-to-nose testing.  Rapid alternating movements within normal limits.         Procedures           ED Course                                           Medical Decision Making  Differential diagnosis includes stroke, TIA, seizure, symptomatic hypertension, new onset hypertension, electrolyte abnormality, subarachnoid hemorrhage.  Patient is not a candidate for IV thrombolytics, due to last known well time of 2200 yesterday, 4/17/2023.    Hypertension, unspecified type: acute illness or injury  Hypokalemia: acute illness or injury  Numbness and tingling of right upper extremity: acute illness or injury  Amount and/or Complexity of Data Reviewed  Labs: ordered. Decision-making details documented in  ED Course.  Radiology: ordered. Decision-making details documented in ED Course.  ECG/medicine tests: ordered. Decision-making details documented in ED Course.  Discussion of management or test interpretation with external provider(s): I discussed the case with the stroke navigator Nikkie at 1454.  Second discussion was had with the stroke navigator, Nikkie.  Inpatient further evaluation recommended, but no indication for neuro intervention at this time.  At 1636, hospitalist Dr. Santos was made aware of the patient.    Risk  Prescription drug management.  Decision regarding hospitalization.        Recent Results (from the past 24 hour(s))   POC CHEM 8    Collection Time: 04/18/23  3:17 PM    Specimen: Blood   Result Value Ref Range    Glucose 126 70 - 130 mg/dL    BUN 7 (L) 8 - 26 mg/dL    Creatinine 0.70 0.60 - 1.30 mg/dL    Sodium 138 138 - 146 mmol/L    POC Potassium 3.0 (L) 3.5 - 4.9 mmol/L    Chloride 94 (L) 98 - 109 mmol/L    Total CO2 32 (H) 24 - 29 mmol/L    Hemoglobin 15.3 12.0 - 17.0 g/dL    Hematocrit 45 38 - 51 %    Ionized Calcium 1.06 (L) 1.20 - 1.32 mmol/L    eGFR 101.0 >60.0 mL/min/1.73   aPTT    Collection Time: 04/18/23  3:19 PM    Specimen: Blood   Result Value Ref Range    PTT 20.0 (L) 22.0 - 39.0 seconds   Single High Sensitivity Troponin T    Collection Time: 04/18/23  3:19 PM    Specimen: Blood   Result Value Ref Range    HS Troponin T <6 <10 ng/L   Green Top (Gel)    Collection Time: 04/18/23  3:19 PM   Result Value Ref Range    Extra Tube Hold for add-ons.    Lavender Top    Collection Time: 04/18/23  3:19 PM   Result Value Ref Range    Extra Tube hold for add-on    Gold Top - SST    Collection Time: 04/18/23  3:19 PM   Result Value Ref Range    Extra Tube Hold for add-ons.    Light Blue Top    Collection Time: 04/18/23  3:19 PM   Result Value Ref Range    Extra Tube Hold for add-ons.    CBC Auto Differential    Collection Time: 04/18/23  3:19 PM    Specimen: Blood   Result Value  Ref Range    WBC 9.83 3.40 - 10.80 10*3/mm3    RBC 4.40 3.77 - 5.28 10*6/mm3    Hemoglobin 14.6 12.0 - 15.9 g/dL    Hematocrit 43.5 34.0 - 46.6 %    MCV 98.9 (H) 79.0 - 97.0 fL    MCH 33.2 (H) 26.6 - 33.0 pg    MCHC 33.6 31.5 - 35.7 g/dL    RDW 14.3 12.3 - 15.4 %    RDW-SD 51.9 37.0 - 54.0 fl    MPV 9.5 6.0 - 12.0 fL    Platelets 329 140 - 450 10*3/mm3    Neutrophil % 69.9 42.7 - 76.0 %    Lymphocyte % 19.6 19.6 - 45.3 %    Monocyte % 8.2 5.0 - 12.0 %    Eosinophil % 1.3 0.3 - 6.2 %    Basophil % 0.6 0.0 - 1.5 %    Immature Grans % 0.4 0.0 - 0.5 %    Neutrophils, Absolute 6.86 1.70 - 7.00 10*3/mm3    Lymphocytes, Absolute 1.93 0.70 - 3.10 10*3/mm3    Monocytes, Absolute 0.81 0.10 - 0.90 10*3/mm3    Eosinophils, Absolute 0.13 0.00 - 0.40 10*3/mm3    Basophils, Absolute 0.06 0.00 - 0.20 10*3/mm3    Immature Grans, Absolute 0.04 0.00 - 0.05 10*3/mm3    nRBC 0.0 0.0 - 0.2 /100 WBC   POC Protime / INR    Collection Time: 04/18/23  3:19 PM    Specimen: Blood   Result Value Ref Range    Protime 13.4 12.8 - 15.2 seconds    INR 1.1 0.8 - 1.2   Magnesium    Collection Time: 04/18/23  3:19 PM    Specimen: Blood   Result Value Ref Range    Magnesium 1.8 1.6 - 2.6 mg/dL   ECG 12 Lead Stroke Evaluation    Collection Time: 04/18/23  3:31 PM   Result Value Ref Range    QT Interval 474 ms    QTC Interval 560 ms     Note: In addition to lab results from this visit, the labs listed above may include labs taken at another facility or during a different encounter within the last 24 hours. Please correlate lab times with ED admission and discharge times for further clarification of the services performed during this visit.    XR Chest 1 View   Final Result   Impression:   No active cardiopulmonary disease         Electronically Signed: Bhavesh Toth     4/18/2023 3:40 PM EDT     Workstation ID: [id number]      CT Angiogram Head w AI Analysis of LVO   Final Result   Impression:      1. No intra or extracranial stenoses, aneurysms, or  "occlusions are identified.            Electronically Signed: Bernabe Escobar     4/18/2023 3:35 PM EDT     Workstation ID: [id number]      CT Angiogram Neck   Final Result   Impression:      1. No intra or extracranial stenoses, aneurysms, or occlusions are identified.            Electronically Signed: Bernabe Escobar     4/18/2023 3:35 PM EDT     Workstation ID: [id number]      CT CEREBRAL PERFUSION WITH & WITHOUT CONTRAST   Final Result   Impression:   Examination appears within normal limits.            Electronically Signed: Vicente Weiss     4/18/2023 3:29 PM EDT     Workstation ID: [id number]      CT Head Without Contrast Stroke Protocol   Final Result   Impression:   No evidence of acute intracranial hemorrhage or large territory infarct.            Electronically Signed: Conrad Hwang     4/18/2023 3:21 PM EDT     Workstation ID: [id number]        Vitals:    04/18/23 1436 04/18/23 1527 04/18/23 1601   BP: (!) 196/92 (!) 168/104 160/86   BP Location: Left arm     Patient Position: Sitting     Pulse: 95  83   Resp: 18     Temp: 98.3 °F (36.8 °C)     TempSrc: Oral     SpO2: 94%  93%   Weight: 97.5 kg (215 lb)     Height: 165.1 cm (65\")       Medications   sodium chloride 0.9 % flush 10 mL (has no administration in time range)   aspirin tablet 325 mg (325 mg Oral Given 4/18/23 1616)     Or   aspirin suppository 300 mg ( Rectal Not Given:  See Alt 4/18/23 1616)   cloNIDine (CATAPRES) tablet 0.1 mg (0.1 mg Oral Given 4/18/23 1533)   iopamidol (ISOVUE-370) 76 % injection 115 mL (115 mL Intravenous Given 4/18/23 1518)   potassium chloride (MICRO-K) CR capsule 40 mEq (40 mEq Oral Given 4/18/23 1616)     ECG/EMG Results (last 24 hours)     Procedure Component Value Units Date/Time    ECG 12 Lead Stroke Evaluation [[id number]] Collected: 04/18/23 1531     Updated: 04/18/23 1539     QT Interval 474 ms      QTC Interval 560 ms     Narrative:      Test Reason : Stroke Evaluation  Blood Pressure :   */*   mmHG  Vent. Rate :  84 BPM  "    Atrial Rate :  84 BPM     P-R Int : 114 ms          QRS Dur :  78 ms      QT Int : 474 ms       P-R-T Axes :  59  62  60 degrees     QTc Int : 560 ms    Normal sinus rhythm  Nonspecific T wave abnormality  Abnormal ECG  When compared with ECG of 13-SEP-2018 15:15,  Nonspecific T wave abnormality now evident in Inferior leads  Nonspecific T wave abnormality now evident in Anterolateral leads  QT has lengthened  Nonspecific ST T wave changes.      Referred By: JI           Confirmed By: Dr. Flanagan        ECG 12 Lead Stroke Evaluation   Preliminary Result   Test Reason : Stroke Evaluation   Blood Pressure :   */*   mmHG   Vent. Rate :  84 BPM     Atrial Rate :  84 BPM      P-R Int : 114 ms          QRS Dur :  78 ms       QT Int : 474 ms       P-R-T Axes :  59  62  60 degrees      QTc Int : 560 ms      Normal sinus rhythm   Nonspecific T wave abnormality   Abnormal ECG   When compared with ECG of 13-SEP-2018 15:15,   Nonspecific T wave abnormality now evident in Inferior leads   Nonspecific T wave abnormality now evident in Anterolateral leads   QT has lengthened      Referred By: JI           Confirmed By:               Final diagnoses:   Numbness and tingling of right upper extremity   Hypertension, unspecified type   Hypokalemia       ED Disposition  ED Disposition     ED Disposition   Decision to Admit    Condition   --    Comment   Level of Care: Telemetry [5]   Diagnosis: Numbness and tingling of right upper extremity [[id number]]   Admitting Physician: AVANI BOWDEN [[id number]               No follow-up provider specified.       Medication List      No changes were made to your prescriptions during this visit.          Cris Flanagan MD  04/18/23 [id number]

## 2023-04-18 NOTE — H&P
[facility] Medicine Services  HISTORY AND PHYSICAL    Patient Name: Radha Armstrong  : 1965  MRN: [id number]  Primary Care Physician: Franci Vaughn MD  Date of admission: 2023    Subjective   Subjective     Chief Complaint:  Right hand numbness, transient head pressure     HPI:  Radha Armstrong is a 57 y.o. female with PMH of depression, anxiety HLD, former smoker. Patient has been having daily  episodes of head pressure  With whole body shaking for about 2 weeks. These episodes happen after she has been sitting and once she has been up walking. Does not happen immediately after she stands. Last week she had an episode where she shook so bad that she fell in the floor. She denies any injury. She states this episodes last a few mins. Once the shaking stops the head pressure also stops. During episodes she denies any cp, soa, fevers, chills, N/V/D, LOC, vision disturbance. She woke up this am with RUE numbness and tingling in the fingers. Last known well time was  at 2200. She states she has not been diagnosed with HTN but does not check her bp at home.     In ED: K 3.0, mag 1.8, WBC 9.83        Review of Systems   Constitutional: Negative for chills and fever.   Respiratory: Negative for shortness of breath.    Cardiovascular: Negative for chest pain.   Gastrointestinal: Positive for diarrhea. Negative for constipation, nausea and vomiting.   Genitourinary: Negative for dysuria.   Neurological: Positive for tremors, numbness and headaches. Negative for speech difficulty.            Personal History     Past Medical History:   Diagnosis Date   • Depression    • Hyperlipidemia              Past Surgical History:   Procedure Laterality Date   • ABDOMINAL SURGERY     • TONSILLECTOMY         Family History:  family history includes Breast cancer (age of onset: 65) in her mother.     Social History:  reports that she has quit smoking. Her smoking use included cigarettes. She has a  10.00 pack-year smoking history. She does not have any smokeless tobacco history on file. She reports current alcohol use. She reports that she does not use drugs.  Social History     Social History Narrative   • Not on file       Medications:  atorvastatin, escitalopram, esomeprazole, and ondansetron ODT    No Known Allergies    Objective   Objective     Vital Signs:   Temp:  [98.3 °F (36.8 °C)] 98.3 °F (36.8 °C)  Heart Rate:  [77-95] 82  Resp:  [18] 18  BP: (140-196)/() 152/99    Physical Exam   Constitutional: Awake, alert  Eyes: PERRLA, sclerae anicteric, no conjunctival injection  HENT: NCAT, mucous membranes moist  Neck: Supple, no thyromegaly, no lymphadenopathy, trachea midline  Respiratory: Clear to auscultation bilaterally, nonlabored respirations room air 93%  Cardiovascular: RRR, no murmurs, rubs, or gallops, palpable pedal pulses bilaterally  Gastrointestinal: Positive bowel sounds, soft, nontender, nondistended  Musculoskeletal: No bilateral ankle edema, no clubbing or cyanosis to extremities  Psychiatric: Appropriate affect, cooperative  Neurologic: Oriented x 3, strength symmetric in all extremities, Cranial Nerves grossly intact to confrontation, speech clear, fingers on right hand tingling, no decreased sensation.   Skin: No rashes      Result Review:  I have personally reviewed the results from the time of this admission to 4/18/2023 18:38 EDT and agree with these findings:  [x]  Laboratory list / accordion  []  Microbiology  [x]  Radiology  [x]  EKG/Telemetry   []  Cardiology/Vascular   []  Pathology  []  Old records  []  Other:  Most notable findings include:     LAB RESULTS:      Lab 04/18/23  1519 04/18/23  1517   WBC 9.83  --    HEMOGLOBIN 14.6  --    HEMOGLOBIN, POC  --  15.3   HEMATOCRIT 43.5  --    HEMATOCRIT POC  --  45   PLATELETS 329  --    NEUTROS ABS 6.86  --    IMMATURE GRANS (ABS) 0.04  --    LYMPHS ABS 1.93  --    MONOS ABS 0.81  --    EOS ABS 0.13  --    MCV 98.9*  --     PROTIME 13.4  --    APTT 20.0*  --          Lab 04/18/23  1519 04/18/23  1517   SODIUM 142  --    POTASSIUM 3.3*  --    CHLORIDE 96*  --    CO2 27.0  --    ANION GAP 19.0*  --    BUN 7  --    CREATININE 0.76 0.70   EGFR 91.5 101.0   GLUCOSE 125*  --    CALCIUM 8.7  --    MAGNESIUM 1.8  --          Lab 04/18/23  1519   TOTAL PROTEIN 7.2   ALBUMIN 3.9   GLOBULIN 3.3   ALT (SGPT) 107*   AST (SGOT) 124*   BILIRUBIN 0.6   ALK PHOS 213*         Lab 04/18/23  1519   HSTROP T <6   PROTIME 13.4   INR 1.1                 Brief Urine Lab Results     None        Microbiology Results (last 10 days)     ** No results found for the last 240 hours. **          CT Angiogram Neck    Result Date: 4/18/2023  CT ANGIOGRAM HEAD W AI ANALYSIS OF LVO, CT ANGIOGRAM NECK Date of Exam: 4/18/2023 3:13 PM EDT Indication: Neuro deficit, acute stroke suspected Neuro deficit, acute stroke suspected. Comparison: None available. Technique: CTA of the head was performed after the uneventful intravenous administration of contrast . Reconstructed coronal and sagittal images were also obtained. In addition, a 3-D volume rendered image was created for interpretation. Automated exposure control and iterative reconstruction methods were used. Findings: The aortic arch is normal in caliber. There is a bovine configuration to the aortic arch. The right brachiocephalic artery is widely patent. The right common carotid artery is patent. There is mild plaquing in the right carotid bulb. The right ICA is widely patent throughout its course. The right subclavian artery, right vertebral artery are widely patent throughout their course. The left common carotid artery is widely patent. The left carotid bulb demonstrates mild plaquing. The left ICA is widely patent. The left subclavian and left vertebral artery are widely patent throughout their course. The intracranial vertebral arteries are widely patent. The basilar artery is widely patent. There is a diminutive  left P1 segment with a large left posterior communicating artery, a normal variant. Bilateral PCAs are widely patent. Bilateral ACAs and bilateral MCAs are widely patent. Mild changes of emphysema. The thyroid is normal. No adenopathy is identified. Parotid and submandibular glands are unremarkable. Normal gray and white matter differentiation. Ventricles and sulci are age-appropriate. Orbits paranasal sinuses and mastoid  air cells are unremarkable. No aggressive appearing lytic or sclerotic bone lesions are identified.     Impression: Impression: 1. No intra or extracranial stenoses, aneurysms, or occlusions are identified. Electronically Signed: Bernabe Escobar  4/18/2023 3:35 PM EDT  Workstation ID: [id number]    XR Chest 1 View    Result Date: 4/18/2023  XR CHEST 1 VW Date of Exam: 4/18/2023 3:31 PM EDT Indication: Acute Stroke Protocol (onset < 12 hrs). Comparison: 9/13/2018 Findings: Heart size and pulmonary vasculature within normal limits. Lungs clear. Costophrenic angles sharp     Impression: Impression: No active cardiopulmonary disease Electronically Signed: Bhavesh Toth  4/18/2023 3:40 PM EDT  Workstation ID: [id number]    CT Head Without Contrast Stroke Protocol    Result Date: 4/18/2023  CT HEAD WO CONTRAST STROKE PROTOCOL Date of Exam: 4/18/2023 3:08 PM EDT Indication: Neuro deficit, acute, stroke suspected Neuro deficit, acute stroke suspected. Comparison: None available. Technique: Axial CT images were obtained of the head without contrast administration.  Reconstructed coronal and sagittal images were also obtained. Automated exposure control and iterative construction methods were used. Scan Time: 3:08 p.m. Results discussed with stroke team at 3:12 p.m. Findings: Parenchyma:No acute intraparenchymal hemorrhage. No loss of gray-white differentiation to suggest large territory infarct. Normal parenchymal volume. No substantial white matter disease. No midline shift or herniation. Ventricles and extra  axial spaces:Normal caliber of ventricles and sulci. No extra axial fluid collection seen. Other:Orbits are grossly intact. Scattered paranasal sinus mucosal thickening. Mastoid air cells are clear. Calvarium is intact. No substantial intracranial atherosclerotic calcification.     Impression: Impression: No evidence of acute intracranial hemorrhage or large territory infarct. Electronically Signed: Conrad Hwang  4/18/2023 3:21 PM EDT  Workstation ID: [id number]    CT Angiogram Head w AI Analysis of LVO    Result Date: 4/18/2023  CT ANGIOGRAM HEAD W AI ANALYSIS OF LVO, CT ANGIOGRAM NECK Date of Exam: 4/18/2023 3:13 PM EDT Indication: Neuro deficit, acute stroke suspected Neuro deficit, acute stroke suspected. Comparison: None available. Technique: CTA of the head was performed after the uneventful intravenous administration of contrast . Reconstructed coronal and sagittal images were also obtained. In addition, a 3-D volume rendered image was created for interpretation. Automated exposure control and iterative reconstruction methods were used. Findings: The aortic arch is normal in caliber. There is a bovine configuration to the aortic arch. The right brachiocephalic artery is widely patent. The right common carotid artery is patent. There is mild plaquing in the right carotid bulb. The right ICA is widely patent throughout its course. The right subclavian artery, right vertebral artery are widely patent throughout their course. The left common carotid artery is widely patent. The left carotid bulb demonstrates mild plaquing. The left ICA is widely patent. The left subclavian and left vertebral artery are widely patent throughout their course. The intracranial vertebral arteries are widely patent. The basilar artery is widely patent. There is a diminutive left P1 segment with a large left posterior communicating artery, a normal variant. Bilateral PCAs are widely patent. Bilateral ACAs and bilateral MCAs are  widely patent. Mild changes of emphysema. The thyroid is normal. No adenopathy is identified. Parotid and submandibular glands are unremarkable. Normal gray and white matter differentiation. Ventricles and sulci are age-appropriate. Orbits paranasal sinuses and mastoid  air cells are unremarkable. No aggressive appearing lytic or sclerotic bone lesions are identified.     Impression: Impression: 1. No intra or extracranial stenoses, aneurysms, or occlusions are identified. Electronically Signed: Bernabe Escobar  4/18/2023 3:35 PM EDT  Workstation ID: [id number]    CT CEREBRAL PERFUSION WITH & WITHOUT CONTRAST    Result Date: 4/18/2023  CT CEREBRAL PERFUSION W WO CONTRAST Date of Exam: 4/18/2023 3:13 PM EDT Indication: Neuro deficit, acute stroke suspected.  Comparison: CT head from earlier today Technique: Axial CT images of the brain were obtained prior to and after the administration of 50 mL Isovue-370. Core blood volume, core blood flow, mean transit time, and Tmax images were obtained utilizing the Rapid software protocol. A limited CT angiogram of the head was also performed to measure the blood vessel density. The radiation dose reduction device was turned on for each scan per the ALARA (As Low as Reasonably Achievable) protocol. Findings: The intracranial cerebral perfusion appears normal.     Impression: Impression: Examination appears within normal limits. Electronically Signed: Vicente Weiss  4/18/2023 3:29 PM EDT  Workstation ID: [id number]          Assessment & Plan   Assessment & Plan       Numbness and tingling of right upper extremity    Hypokalemia    GERD (gastroesophageal reflux disease)    HLD (hyperlipidemia)    Elevated blood-pressure reading without diagnosis of hypertension    Elevated LFTs    Numbness and tingling of RUE  Transient neurologic symptoms   -- stroke neurology team has seen   -- Possible TIA, Stroke or hypertensive in etiology   -- CT head neg, CTP with normal brain perfusion  -CTA  H/N with no flow-limiting stenosis, occlusion, or aneurysm noted.  -- MRi of brain pending   -- ECHO in am   -- check Lipid panel and A1c in am   -- cont ASA    -- bp goal < 180  -- consult PT.OT.CM. speech in am     Elevated blood pressure without HTN diagnosis   -- goal to keep sbp < 180  -- will likely need to be started on meds prior to discharge     Anxiety  Depression  -- cont lexapro     GERD  -- cont PPI     Elevated LFT's   Weekly N/V  -- hold statin   -- labs in am   --check hepatis profile   -- last know normal labs in 2018  -- check US of gallbladder     Hypokalemia   -- replace mag as needed  -- replace per protocol.       DVT prophylaxis:  [facility]     CODE STATUS:  Full code   Level Of Support Discussed With: Patient  Code Status (Patient has no pulse and is not breathing): CPR (Attempt to Resuscitate)  Medical Interventions (Patient has pulse or is breathing): Full Support      Expected Discharge    This note has been completed as part of a split-shared workflow.     Signature: Electronically signed by SHIN Glasgow, 04/18/23, 5:09 PM EDT.  Time spent by APC 90 min  Patient seen and examined at the bedside.  Patient is a 57-year-old  female with past medical history of hyperlipidemia, depression and anxiety, history of smoking.  Evidently patient has had episodic feeling of pressure in her head and sometimes headache which does not last very long and causes gait instability.  Evidently there was 1 episode last week that she experienced instability to the extent that she had to sit down on the floor and after a few minutes it subsided.  Patient woke up today with right hand and arm numbness.  The symptoms did not subside and that prompted the patient to come to the emergency room.  He had the ER work-up including CT scan and CTA or nonrevealing.  Neurology team also has been consulted and they are following.  Denies any fever or chills.  No nausea vomiting or diarrhea.    Physical  exam:  Constitutional: No acute distress  HENT: NCAT, mucous membranes moist  Respiratory: Clear to auscultation bilaterally, respiratory effort normal   Cardiovascular: RRR, no murmurs, rubs, or gallops  Gastrointestinal: Abdomen is obese.  Positive bowel sounds, soft, nontender, nondistended  Musculoskeletal: No bilateral ankle edema  Psychiatric: Appropriate affect, cooperative  Neurologic: Oriented x 3, strength symmetric in all extremities, Cranial Nerves grossly intact to confrontation, speech clear  Skin: No rashes    Assessment and plan:  Patient is a 57-year-old  female with past medical history of hyperlipidemia and anxiety depression.  Patient has had episodes of feeling pressure in her head for the past week.  Patient also woke up this morning with right arm and hand numbness.  Symptoms suggestive of either acute CVA or may be TIA.  MRI pending and stroke team following.  We will admit the patient and monitor.  Please see the above for more details.    Total time spent was 30 minutes.

## 2023-04-19 ENCOUNTER — APPOINTMENT (OUTPATIENT)
Dept: ULTRASOUND IMAGING | Facility: HOSPITAL | Age: 58
End: 2023-04-19
Payer: COMMERCIAL

## 2023-04-19 ENCOUNTER — APPOINTMENT (OUTPATIENT)
Dept: NEUROLOGY | Facility: HOSPITAL | Age: 58
End: 2023-04-19
Payer: COMMERCIAL

## 2023-04-19 ENCOUNTER — APPOINTMENT (OUTPATIENT)
Dept: MRI IMAGING | Facility: HOSPITAL | Age: 58
End: 2023-04-19
Payer: COMMERCIAL

## 2023-04-19 LAB
ALBUMIN SERPL-MCNC: 3.9 G/DL (ref 3.5–5.2)
ALBUMIN/GLOB SERPL: 1.6 G/DL
ALP SERPL-CCNC: 194 U/L (ref 39–117)
ALT SERPL W P-5'-P-CCNC: 92 U/L (ref 1–33)
ANION GAP SERPL CALCULATED.3IONS-SCNC: 14 MMOL/L (ref 5–15)
AST SERPL-CCNC: 101 U/L (ref 1–32)
BILIRUB SERPL-MCNC: 0.8 MG/DL (ref 0–1.2)
BUN SERPL-MCNC: 9 MG/DL (ref 6–20)
BUN/CREAT SERPL: 12 (ref 7–25)
CALCIUM SPEC-SCNC: 8.8 MG/DL (ref 8.6–10.5)
CHLORIDE SERPL-SCNC: 96 MMOL/L (ref 98–107)
CHOLEST SERPL-MCNC: 198 MG/DL (ref 0–200)
CO2 SERPL-SCNC: 30 MMOL/L (ref 22–29)
CREAT SERPL-MCNC: 0.75 MG/DL (ref 0.57–1)
DEPRECATED RDW RBC AUTO: 54.2 FL (ref 37–54)
EGFRCR SERPLBLD CKD-EPI 2021: 93 ML/MIN/1.73
ERYTHROCYTE [DISTWIDTH] IN BLOOD BY AUTOMATED COUNT: 14.6 % (ref 12.3–15.4)
GLOBULIN UR ELPH-MCNC: 2.4 GM/DL
GLUCOSE BLDC GLUCOMTR-MCNC: 119 MG/DL (ref 70–130)
GLUCOSE BLDC GLUCOMTR-MCNC: 94 MG/DL (ref 70–130)
GLUCOSE SERPL-MCNC: 88 MG/DL (ref 65–99)
HBA1C MFR BLD: 5.5 % (ref 4.8–5.6)
HCT VFR BLD AUTO: 40.1 % (ref 34–46.6)
HDLC SERPL-MCNC: 60 MG/DL (ref 40–60)
HGB BLD-MCNC: 13.1 G/DL (ref 12–15.9)
LDLC SERPL CALC-MCNC: 120 MG/DL (ref 0–100)
LDLC/HDLC SERPL: 1.97 {RATIO}
MCH RBC QN AUTO: 32.8 PG (ref 26.6–33)
MCHC RBC AUTO-ENTMCNC: 32.7 G/DL (ref 31.5–35.7)
MCV RBC AUTO: 100.3 FL (ref 79–97)
PLATELET # BLD AUTO: 320 10*3/MM3 (ref 140–450)
PMV BLD AUTO: 10 FL (ref 6–12)
POTASSIUM SERPL-SCNC: 3.5 MMOL/L (ref 3.5–5.2)
POTASSIUM SERPL-SCNC: 3.6 MMOL/L (ref 3.5–5.2)
PROT SERPL-MCNC: 6.3 G/DL (ref 6–8.5)
QT INTERVAL: 404 MS
QT INTERVAL: 424 MS
QTC INTERVAL: 457 MS
QTC INTERVAL: 473 MS
RBC # BLD AUTO: 4 10*6/MM3 (ref 3.77–5.28)
SODIUM SERPL-SCNC: 140 MMOL/L (ref 136–145)
TRIGL SERPL-MCNC: 99 MG/DL (ref 0–150)
VLDLC SERPL-MCNC: 18 MG/DL (ref 5–40)
WBC NRBC COR # BLD: 7.72 10*3/MM3 (ref 3.4–10.8)

## 2023-04-19 PROCEDURE — G0378 HOSPITAL OBSERVATION PER HR: HCPCS

## 2023-04-19 PROCEDURE — 82962 GLUCOSE BLOOD TEST: CPT

## 2023-04-19 PROCEDURE — 85027 COMPLETE CBC AUTOMATED: CPT | Performed by: NURSE PRACTITIONER

## 2023-04-19 PROCEDURE — 97165 OT EVAL LOW COMPLEX 30 MIN: CPT

## 2023-04-19 PROCEDURE — 99232 SBSQ HOSP IP/OBS MODERATE 35: CPT | Performed by: HOSPITALIST

## 2023-04-19 PROCEDURE — 80053 COMPREHEN METABOLIC PANEL: CPT | Performed by: NURSE PRACTITIONER

## 2023-04-19 PROCEDURE — 95816 EEG AWAKE AND DROWSY: CPT

## 2023-04-19 PROCEDURE — 84132 ASSAY OF SERUM POTASSIUM: CPT | Performed by: HOSPITALIST

## 2023-04-19 PROCEDURE — 93005 ELECTROCARDIOGRAM TRACING: CPT | Performed by: HOSPITALIST

## 2023-04-19 PROCEDURE — 99233 SBSQ HOSP IP/OBS HIGH 50: CPT | Performed by: PSYCHIATRY & NEUROLOGY

## 2023-04-19 PROCEDURE — 80061 LIPID PANEL: CPT | Performed by: NURSE PRACTITIONER

## 2023-04-19 PROCEDURE — 76705 ECHO EXAM OF ABDOMEN: CPT

## 2023-04-19 PROCEDURE — 83036 HEMOGLOBIN GLYCOSYLATED A1C: CPT | Performed by: NURSE PRACTITIONER

## 2023-04-19 PROCEDURE — 70551 MRI BRAIN STEM W/O DYE: CPT

## 2023-04-19 PROCEDURE — 92523 SPEECH SOUND LANG COMPREHEN: CPT

## 2023-04-19 PROCEDURE — 93010 ELECTROCARDIOGRAM REPORT: CPT | Performed by: INTERNAL MEDICINE

## 2023-04-19 PROCEDURE — 97161 PT EVAL LOW COMPLEX 20 MIN: CPT

## 2023-04-19 RX ORDER — SODIUM CHLORIDE 9 MG/ML
40 INJECTION, SOLUTION INTRAVENOUS AS NEEDED
Status: DISCONTINUED | OUTPATIENT
Start: 2023-04-19 | End: 2023-04-19

## 2023-04-19 RX ORDER — ACETAMINOPHEN 325 MG/1
650 TABLET ORAL EVERY 4 HOURS PRN
Status: DISCONTINUED | OUTPATIENT
Start: 2023-04-19 | End: 2023-04-21 | Stop reason: HOSPADM

## 2023-04-19 RX ORDER — SODIUM CHLORIDE 0.9 % (FLUSH) 0.9 %
10 SYRINGE (ML) INJECTION AS NEEDED
Status: DISCONTINUED | OUTPATIENT
Start: 2023-04-19 | End: 2023-04-19

## 2023-04-19 RX ORDER — POTASSIUM CHLORIDE 750 MG/1
40 CAPSULE, EXTENDED RELEASE ORAL EVERY 4 HOURS
Status: COMPLETED | OUTPATIENT
Start: 2023-04-19 | End: 2023-04-19

## 2023-04-19 RX ORDER — ESCITALOPRAM OXALATE 20 MG/1
20 TABLET ORAL DAILY
Status: DISCONTINUED | OUTPATIENT
Start: 2023-04-19 | End: 2023-04-21 | Stop reason: HOSPADM

## 2023-04-19 RX ORDER — ACETAMINOPHEN 160 MG/5ML
650 SOLUTION ORAL EVERY 4 HOURS PRN
Status: DISCONTINUED | OUTPATIENT
Start: 2023-04-19 | End: 2023-04-21 | Stop reason: HOSPADM

## 2023-04-19 RX ORDER — ONDANSETRON 2 MG/ML
4 INJECTION INTRAMUSCULAR; INTRAVENOUS EVERY 6 HOURS PRN
Status: DISCONTINUED | OUTPATIENT
Start: 2023-04-19 | End: 2023-04-21 | Stop reason: HOSPADM

## 2023-04-19 RX ORDER — SODIUM CHLORIDE 0.9 % (FLUSH) 0.9 %
10 SYRINGE (ML) INJECTION EVERY 12 HOURS SCHEDULED
Status: DISCONTINUED | OUTPATIENT
Start: 2023-04-19 | End: 2023-04-21 | Stop reason: HOSPADM

## 2023-04-19 RX ORDER — PANTOPRAZOLE SODIUM 40 MG/1
40 TABLET, DELAYED RELEASE ORAL
Status: DISCONTINUED | OUTPATIENT
Start: 2023-04-19 | End: 2023-04-21 | Stop reason: HOSPADM

## 2023-04-19 RX ORDER — ACETAMINOPHEN 650 MG/1
650 SUPPOSITORY RECTAL EVERY 4 HOURS PRN
Status: DISCONTINUED | OUTPATIENT
Start: 2023-04-19 | End: 2023-04-21 | Stop reason: HOSPADM

## 2023-04-19 RX ORDER — ONDANSETRON 4 MG/1
4 TABLET, FILM COATED ORAL EVERY 6 HOURS PRN
Status: DISCONTINUED | OUTPATIENT
Start: 2023-04-19 | End: 2023-04-21 | Stop reason: HOSPADM

## 2023-04-19 RX ORDER — SODIUM CHLORIDE 9 MG/ML
40 INJECTION, SOLUTION INTRAVENOUS AS NEEDED
Status: DISCONTINUED | OUTPATIENT
Start: 2023-04-19 | End: 2023-04-21 | Stop reason: HOSPADM

## 2023-04-19 RX ORDER — SODIUM CHLORIDE 0.9 % (FLUSH) 0.9 %
10 SYRINGE (ML) INJECTION AS NEEDED
Status: DISCONTINUED | OUTPATIENT
Start: 2023-04-19 | End: 2023-04-21 | Stop reason: HOSPADM

## 2023-04-19 RX ORDER — SODIUM CHLORIDE 0.9 % (FLUSH) 0.9 %
10 SYRINGE (ML) INJECTION EVERY 12 HOURS SCHEDULED
Status: DISCONTINUED | OUTPATIENT
Start: 2023-04-19 | End: 2023-04-19

## 2023-04-19 RX ADMIN — ESCITALOPRAM OXALATE 20 MG: 20 TABLET ORAL at 09:21

## 2023-04-19 RX ADMIN — Medication 10 ML: at 09:21

## 2023-04-19 RX ADMIN — POTASSIUM CHLORIDE 40 MEQ: 750 CAPSULE, EXTENDED RELEASE ORAL at 16:20

## 2023-04-19 RX ADMIN — POTASSIUM CHLORIDE 40 MEQ: 750 CAPSULE, EXTENDED RELEASE ORAL at 09:21

## 2023-04-19 RX ADMIN — Medication 10 ML: at 20:05

## 2023-04-19 RX ADMIN — Medication 12.5 MG: at 20:04

## 2023-04-19 RX ADMIN — ASPIRIN 325 MG: 325 TABLET ORAL at 09:20

## 2023-04-19 RX ADMIN — PANTOPRAZOLE SODIUM 40 MG: 40 TABLET, DELAYED RELEASE ORAL at 09:21

## 2023-04-19 NOTE — THERAPY DISCHARGE NOTE
Acute Care - Speech Language Pathology Initial Evaluation/Discharge  [facility]     Cognitive-Communication Evaluation       Patient Name: Radha Armstrong  : 1965  MRN: [id number]  Today's Date: 2023               Admit Date: 2023     Visit Dx:    ICD-10-CM ICD-9-CM   1. Numbness and tingling of right upper extremity  R20.0 782.0    R20.2    2. Hypertension, unspecified type  I10 401.9   3. Hypokalemia  E87.6 276.8   4. Cognitive communication deficit  R41.841 799.52     Patient Active Problem List   Diagnosis   • Hypertension   • Numbness and tingling of right upper extremity   • Hypokalemia   • GERD (gastroesophageal reflux disease)   • HLD (hyperlipidemia)   • Elevated blood-pressure reading without diagnosis of hypertension   • Elevated LFTs     Past Medical History:   Diagnosis Date   • Depression    • Hyperlipidemia      Past Surgical History:   Procedure Laterality Date   • ABDOMINAL SURGERY     • TONSILLECTOMY         SLP Recommendation and Plan  SLP Diagnosis: functional speech/language skills, functional cognitive-linguistic skills (23)           Anticipated Discharge Disposition (SLP): home (23)                       Reason for Discharge: all goals and outcomes met, no further needs identified (23)                     SLP EVALUATION (last 72 hours)     SLP SLC Evaluation     Row Name 23                   Communication Assessment/Intervention    Document Type discharge evaluation/summary  -CH        Subjective Information no complaints  -CH        Patient Observations alert;cooperative;agree to therapy  -CH        Patient/Family/Caregiver Comments/Observations none  -CH        Patient Effort excellent  -CH        Symptoms Noted During/After Treatment none  -CH           General Information    Patient Profile Reviewed yes  -CH           Pain    Additional Documentation Pain Scale: FACES Pre/Post-Treatment (Group)  -CH           Pain Scale: Numbers  Pre/Post-Treatment    Pretreatment Pain Rating 0/10 - no pain  -CH        Posttreatment Pain Rating 0/10 - no pain  -CH           Pain Scale: FACES Pre/Post-Treatment    Pain: FACES Scale, Pretreatment 0-->no hurt  -CH        Posttreatment Pain Rating 0-->no hurt  -CH           Comprehension Assessment/Intervention    Comprehension Assessment/Intervention Auditory Comprehension;Reading Comprehension  -CH           Auditory Comprehension Assessment/Intervention    Auditory Comprehension (Communication) WNL  -CH           Reading Comprehension Assessment/Intervention    Reading Comprehension (Communication) WNL  -CH           Expression Assessment/Intervention    Expression Assessment/Intervention verbal expression  -CH           Verbal Expression Assessment/Intervention    Verbal Expression WNL  -        Automatic Speech (Communication) response to greeting;WNL  -        Repetition sentences;WNL  -        Responsive Naming complex;WNL  -        Confrontational Naming low frequency;WNL  -        Spontaneous/Functional Words complex;WNL  -        Sentence Formulation complex;WNL  -        Conversational Discourse/Fluency WNL  -           Oral Motor Structure and Function    Oral Motor Structure and Function WNL  -CH           Motor Speech Assessment/Intervention    Motor Speech Function WNL  -CH           Cursory Voice Assessment/Intervention    Quality and Resonance (Voice) WNL  -           Cognitive Assessment Intervention- SLP    Cognitive Function (Cognition) WNL  -        Orientation Status (Cognition) awareness of basic personal information;person;place;time;situation;WNL  -        Memory (Cognitive) simple;functional;immediate;short-term;long-term;delayed;WNL  -        Attention (Cognitive) selective;sustained;attention to detail;WNL  -        Thought Organization (Cognitive) concrete divergent;abstract divergent;concrete convergent;abstract convergent;drawing conclusions;verbal  sequencing;WNL  -CH        Reasoning (Cognitive) simple;deductive;WNL  -CH        Problem Solving (Cognitive) simple;temporal;WNL  -CH        Functional Math (Cognitive) simple;word problems;WNL  -CH        Pragmatics (Communication) WNL  -CH           SLP Evaluation Clinical Impressions    SLP Diagnosis functional speech/language skills;functional cognitive-linguistic skills  -        Functional Impact no impact on function  -           Recommendations    Therapy Frequency (SLP SLC) evaluation only  -        Anticipated Discharge Disposition (SLP) home  -           SLP Discharge Summary    Discharge Destination unknown  -        Discharge Diagnostic Statement No deficits identified with speech, language or cognition  -        Progress Toward Achieving Short/long Term Goals discharge on same date as initial evaluation  -        Reason for Discharge all goals and outcomes met, no further needs identified  -              User Key  (r) = Recorded By, (t) = Taken By, (c) = Cosigned By    Initials Name Effective Dates    Camilla Nunez MS CCC-SLP 06/16/21 -                    EDUCATION  The patient has been educated in the following areas:   Cognitive Impairment Communication Impairment.                  Time Calculation:    Time Calculation- SLP     Row Name 04/19/23 1550             Time Calculation- SLP    SLP Start Time 1540  -      SLP Received On 04/19/23  -         Untimed Charges    SLP Eval/Re-eval  ST Eval Speech and Production w/ Language - [id number]  -      [id number] Eval Speech and Production w/ Language Minutes 38  -CH         Total Minutes    Untimed Charges Total Minutes 38  -CH       Total Minutes 38  -CH            User Key  (r) = Recorded By, (t) = Taken By, (c) = Cosigned By    Initials Name Provider Type    Camilla Nunez MS CCC-SLP Speech and Language Pathologist                Therapy Charges for Today     Code Description Service Date Service Provider Modifiers Qty     [id number] [facility] EVAL SPEECH AND PROD W LANG  3 4/19/2023 Camilla Barros, MS CCC-SLP GN 1                   SLP Discharge Summary  Anticipated Discharge Disposition (SLP): home  Reason for Discharge: all goals and outcomes met, no further needs identified  Progress Toward Achieving Short/long Term Goals: discharge on same date as initial evaluation  Discharge Destination: unknown    Camilla Barros MS CCC-SLP  4/19/2023

## 2023-04-19 NOTE — CASE MANAGEMENT/SOCIAL WORK
Continued Stay Note  [facility]     Patient Name: Radha Armstrong  MRN: [id number]  Today's Date: 4/19/2023    Admit Date: 4/18/2023    Plan: home with family   Discharge Plan     Row Name 04/19/23 1101       Plan    Plan home with family    Patient/Family in Agreement with Plan yes    Plan Comments Pt lives with her  in [facility]. She is independent with ADLs and denies use of any DME. She is followed by her PCP and has drug coverage. Plan for discharge is to return home. CM to follow for any discharge needs.    Final Discharge Disposition Code 01 - home or self-care               Discharge Codes    No documentation.               Expected Discharge Date and Time     Expected Discharge Date Expected Discharge Time    Apr 20, 2023             Peyton Salcido RN

## 2023-04-19 NOTE — PROGRESS NOTES
Neurology Note    Patient:  Radha Armstrong    YOB: 1965    REFERRING PHYSICIAN:  Dr. Chapman    CHIEF COMPLAINT:    Right hand numbness and arm heaviness    HISTORY OF PRESENT ILLNESS:   The patient reports that her arm symptoms have resolved. She reports new headaches and tremulousness with activity for several weeks. She has been able to eat and walk today.    Past Medical History:  Past Medical History:   Diagnosis Date   • Depression    • Hyperlipidemia        Past Surgical History:  Past Surgical History:   Procedure Laterality Date   • ABDOMINAL SURGERY     • TONSILLECTOMY         Social History:   Social History     Socioeconomic History   • Marital status:    Tobacco Use   • Smoking status: Former     Packs/day: 1.00     Years: 10.00     Pack years: 10.00     Types: Cigarettes   Vaping Use   • Vaping Use: Never used   Substance and Sexual Activity   • Alcohol use: Yes     Comment: 2 drinks/day, wine, bourbon, vodka   • Drug use: No        Family History:   Family History   Problem Relation Age of Onset   • Breast cancer Mother 65   • Ovarian cancer Neg Hx        Medications Prior to Admission:    Prior to Admission medications    Medication Sig Start Date End Date Taking? Authorizing Provider   atorvastatin (LIPITOR) 10 MG tablet Take 10 mg by mouth Daily.    ProviderTyrell MD   escitalopram (LEXAPRO) 20 MG tablet Take 20 mg by mouth Daily.    ProviderTyrell MD   esomeprazole (nexIUM) 40 MG capsule Take 1 capsule by mouth Every Morning Before Breakfast.    ProviderTyrell MD   ondansetron ODT (ZOFRAN-ODT) 4 MG disintegrating tablet Take 1 tablet by mouth Every 8 (Eight) Hours As Needed for Nausea or Vomiting. 9/13/18   Jay Childs MD       Allergies:  Patient has no known allergies.      Review of system  Review of Systems   HENT: Negative for trouble swallowing.    Musculoskeletal: Negative for gait problem.   Neurological: Negative for weakness, numbness and  headaches.   All other systems reviewed and are negative.      Vitals:    04/19/23 1205   BP: 150/97   Pulse: 80   Resp: 18   Temp: 97.7 °F (36.5 °C)   SpO2: 91%       Physical exam  Physical Exam  Constitutional:       Appearance: She is well-developed.   Cardiovascular:      Rate and Rhythm: Normal rate and regular rhythm.   Pulmonary:      Effort: Pulmonary effort is normal.   Neurological:      Mental Status: She is alert and oriented to person, place, and time.      Deep Tendon Reflexes: Reflexes are normal and symmetric.   Psychiatric:         Behavior: Behavior normal.         Thought Content: Thought content normal.           Lab Results   Component Value Date    WBC 7.72 04/19/2023    HGB 13.1 04/19/2023    HCT 40.1 04/19/2023    .3 (H) 04/19/2023     04/19/2023     Lab Results   Component Value Date    GLUCOSE 88 04/19/2023    BUN 9 04/19/2023    CREATININE 0.75 04/19/2023    EGFRIFNONA 69 09/13/2018    BCR 12.0 04/19/2023    CO2 30.0 (H) 04/19/2023    CALCIUM 8.8 04/19/2023    ALBUMIN 3.9 04/19/2023     (H) 04/19/2023    ALT 92 (H) 04/19/2023     ECG 12 Lead QT Measurement (Order [id number])  Order-Level Documents:    Scan on 4/19/2023 1146 by [facility] [employer], [place]: ECG 12-LEAD         Author: -- Service: -- Author Type: --   Filed:  Date of Service:  Creation Time:    Status: (Other)   Test Reason : QT Measurement  Blood Pressure :   */*   mmHG  Vent. Rate :  77 BPM     Atrial Rate :  78 BPM     P-R Int :   * ms          QRS Dur :  84 ms      QT Int : 404 ms       P-R-T Axes :   *  50  -3 degrees     QTc Int : 457 ms     sinus rhythm   Nonspecific T wave abnormality  Abnormal ECG  When compared with ECG of 18-APR-2023 15:31, (Unconfirmed)  Junctional rhythm has replaced Sinus rhythm  Nonspecific T wave abnormality no longer evident in Anterolateral leads  QT has shortened  Confirmed by Lorenzo Manriquez ([id number]) on 4/19/2023 12:42:03 PM     Referred By:            Confirmed By: Lorenzo  Mitra        Signed    Electronically signed by Lorenzo Manriquez MD on 4/19/23 at 1242 EDT         Radiological Studies:  CT Angiogram Neck    Result Date: 4/18/2023  CT ANGIOGRAM HEAD W AI ANALYSIS OF LVO, CT ANGIOGRAM NECK Date of Exam: 4/18/2023 3:13 PM EDT Indication: Neuro deficit, acute stroke suspected Neuro deficit, acute stroke suspected. Comparison: None available. Technique: CTA of the head was performed after the uneventful intravenous administration of contrast . Reconstructed coronal and sagittal images were also obtained. In addition, a 3-D volume rendered image was created for interpretation. Automated exposure control and iterative reconstruction methods were used. Findings: The aortic arch is normal in caliber. There is a bovine configuration to the aortic arch. The right brachiocephalic artery is widely patent. The right common carotid artery is patent. There is mild plaquing in the right carotid bulb. The right ICA is widely patent throughout its course. The right subclavian artery, right vertebral artery are widely patent throughout their course. The left common carotid artery is widely patent. The left carotid bulb demonstrates mild plaquing. The left ICA is widely patent. The left subclavian and left vertebral artery are widely patent throughout their course. The intracranial vertebral arteries are widely patent. The basilar artery is widely patent. There is a diminutive left P1 segment with a large left posterior communicating artery, a normal variant. Bilateral PCAs are widely patent. Bilateral ACAs and bilateral MCAs are widely patent. Mild changes of emphysema. The thyroid is normal. No adenopathy is identified. Parotid and submandibular glands are unremarkable. Normal gray and white matter differentiation. Ventricles and sulci are age-appropriate. Orbits paranasal sinuses and mastoid  air cells are unremarkable. No aggressive appearing lytic or sclerotic bone lesions are  identified.     Impression: 1. No intra or extracranial stenoses, aneurysms, or occlusions are identified. Electronically Signed: Bernabe Escobar  4/18/2023 3:35 PM EDT  Workstation ID: [id number]    US Gallbladder    Result Date: 4/19/2023  US GALLBLADDER Date of Exam: 4/19/2023 8:15 AM EDT Indication: elevated LFT's and NV. Comparison: CT abdomen pelvis 6/21/2014. Gallbladder ultrasound also 6/21/2014. Technique: Grayscale and color Doppler ultrasound evaluation of the right upper quadrant was performed. Findings: The liver is diffusely increased in echogenicity. There is coarsening of the liver echotexture and there is difficult sonographic penetration of the liver. There is no focal liver abnormality.  The portal vein and hepatic veins demonstrate normal directional flow and exhibit normal waveform on spectral imaging. The gallbladder is nondistended. There is no evidence of cholelithiasis. There is no evidence of acute cholecystitis. The common bile duct measures 5 mm diameter. The right kidney measures 8.7 x 4.8 x 6.0 cm. Kidney echogenicity and vascularity appear within normal limits.  There is no solid kidney mass, echogenic shadowing stone or hydronephrosis. Limited visualization of the pancreas and aorta is unremarkable. There is no significant ascites within the right upper quadrant.     Impression: 1. Increased liver echogenicity with coarsening of the echotexture likely reflecting hepatic steatosis or other chronic hepatocellular disease. 2. No acute findings. Electronically Signed: Rufino Cuevas  4/19/2023 8:48 AM EDT  Workstation ID: [id number]    XR Chest 1 View    Result Date: 4/18/2023  XR CHEST 1 VW Date of Exam: 4/18/2023 3:31 PM EDT Indication: Acute Stroke Protocol (onset < 12 hrs). Comparison: 9/13/2018 Findings: Heart size and pulmonary vasculature within normal limits. Lungs clear. Costophrenic angles sharp     Impression: No active cardiopulmonary disease Electronically Signed: Bhavesh Toth   4/18/2023 3:40 PM EDT  Workstation ID: [id number]    CT Head Without Contrast Stroke Protocol    Result Date: 4/18/2023  CT HEAD WO CONTRAST STROKE PROTOCOL Date of Exam: 4/18/2023 3:08 PM EDT Indication: Neuro deficit, acute, stroke suspected Neuro deficit, acute stroke suspected. Comparison: None available. Technique: Axial CT images were obtained of the head without contrast administration.  Reconstructed coronal and sagittal images were also obtained. Automated exposure control and iterative construction methods were used. Scan Time: 3:08 p.m. Results discussed with stroke team at 3:12 p.m. Findings: Parenchyma:No acute intraparenchymal hemorrhage. No loss of gray-white differentiation to suggest large territory infarct. Normal parenchymal volume. No substantial white matter disease. No midline shift or herniation. Ventricles and extra axial spaces:Normal caliber of ventricles and sulci. No extra axial fluid collection seen. Other:Orbits are grossly intact. Scattered paranasal sinus mucosal thickening. Mastoid air cells are clear. Calvarium is intact. No substantial intracranial atherosclerotic calcification.     Impression: No evidence of acute intracranial hemorrhage or large territory infarct. Electronically Signed: Conrad Hwang  4/18/2023 3:21 PM EDT  Workstation ID: [id number]    CT Angiogram Head w AI Analysis of LVO    Result Date: 4/18/2023  CT ANGIOGRAM HEAD W AI ANALYSIS OF LVO, CT ANGIOGRAM NECK Date of Exam: 4/18/2023 3:13 PM EDT Indication: Neuro deficit, acute stroke suspected Neuro deficit, acute stroke suspected. Comparison: None available. Technique: CTA of the head was performed after the uneventful intravenous administration of contrast . Reconstructed coronal and sagittal images were also obtained. In addition, a 3-D volume rendered image was created for interpretation. Automated exposure control and iterative reconstruction methods were used. Findings: The aortic arch is normal in caliber.  There is a bovine configuration to the aortic arch. The right brachiocephalic artery is widely patent. The right common carotid artery is patent. There is mild plaquing in the right carotid bulb. The right ICA is widely patent throughout its course. The right subclavian artery, right vertebral artery are widely patent throughout their course. The left common carotid artery is widely patent. The left carotid bulb demonstrates mild plaquing. The left ICA is widely patent. The left subclavian and left vertebral artery are widely patent throughout their course. The intracranial vertebral arteries are widely patent. The basilar artery is widely patent. There is a diminutive left P1 segment with a large left posterior communicating artery, a normal variant. Bilateral PCAs are widely patent. Bilateral ACAs and bilateral MCAs are widely patent. Mild changes of emphysema. The thyroid is normal. No adenopathy is identified. Parotid and submandibular glands are unremarkable. Normal gray and white matter differentiation. Ventricles and sulci are age-appropriate. Orbits paranasal sinuses and mastoid  air cells are unremarkable. No aggressive appearing lytic or sclerotic bone lesions are identified.     Impression: 1. No intra or extracranial stenoses, aneurysms, or occlusions are identified. Electronically Signed: Bernabe Escobar  4/18/2023 3:35 PM EDT  Workstation ID: [id number]    CT CEREBRAL PERFUSION WITH & WITHOUT CONTRAST    Result Date: 4/18/2023  CT CEREBRAL PERFUSION W WO CONTRAST Date of Exam: 4/18/2023 3:13 PM EDT Indication: Neuro deficit, acute stroke suspected.  Comparison: CT head from earlier today Technique: Axial CT images of the brain were obtained prior to and after the administration of 50 mL Isovue-370. Core blood volume, core blood flow, mean transit time, and Tmax images were obtained utilizing the Rapid software protocol. A limited CT angiogram of the head was also performed to measure the blood vessel  density. The radiation dose reduction device was turned on for each scan per the ALARA (As Low as Reasonably Achievable) protocol. Findings: The intracranial cerebral perfusion appears normal.     Impression: Examination appears within normal limits. Electronically Signed: Vicente Weiss  4/18/2023 3:29 PM EDT  Workstation ID: [id number]        During this visit the following were done:  Labs Reviewed [x]    Labs Ordered []    Radiology Reports Reviewed [x]    Radiology Ordered []    EKG, echo, and/or stress test reviewed [x]    EEG results reviewed  []    EEG reviewed and interpreted per myself   []    Discussed case with neurointerventionalist or neuroradiologist []    Referring Provider Records Reviewed []    ER Records Reviewed []    Hospital Records Reviewed []    History Obtained From Family []    Radiological images view and Interpreted per myself [x]    Case Discussed with referring provider []     Decision to obtain and request outside records  []        Assessment and Plan     Stroke-like symptoms, TIA vs a small CVA. Fluctuating headaches and tremor, suspect role of HTN. BP improved. CT/CTP/CTA unremarkable, personally reviewed.   - Observation on telemetry.   - Normalize BP, long term goal<130/80.   - Continue ASA.   - Statin on hold 22 elevated LFTs.   - MRI mbrain.   - EEG.   - TTE.              Electronically signed by Esteban Ball MD on 4/19/2023 at 14:08 EDT

## 2023-04-19 NOTE — PROGRESS NOTES
[facility] Medicine Services  PROGRESS NOTE    Patient Name: Radha Armstrong  : 1965  MRN: [id number]    Date of Admission: 2023  Primary Care Physician: Franci Vaughn MD    Subjective   Subjective     CC:  F/U right hand numbness    HPI:  Seen this morning, her right hand numbness is essentially resolved. She did well with PT/OT.      ROS:  Gen-no fevers, no chills  CV-no chest pain, no palpitations  Resp-no cough, no dyspnea  GI-no N/V/D, no abd pain      Objective   Objective     Vital Signs:   Temp:  [97.7 °F (36.5 °C)-98.3 °F (36.8 °C)] 97.7 °F (36.5 °C)  Heart Rate:  [74-83] 80  Resp:  [18] 18  BP: (135-168)/() 150/97     Physical Exam:  Gen-no acute distress  HENT-NCAT, mucous membranes moist  CV-RRR, S1 S2 normal, no m/r/g  Resp-CTAB, no wheezes or rales  Abd-soft, NT, ND, +BS  Ext-no edema  Neuro-A&Ox3, no focal deficits  Skin-no rashes  Psych-appropriate mood      Results Reviewed:  LAB RESULTS:      Lab 23  0730 23  15123  1517   WBC 7.72 9.83  --    HEMOGLOBIN 13.1 14.6  --    HEMOGLOBIN, POC  --   --  15.3   HEMATOCRIT 40.1 43.5  --    HEMATOCRIT POC  --   --  45   PLATELETS 320 329  --    NEUTROS ABS  --  6.86  --    IMMATURE GRANS (ABS)  --  0.04  --    LYMPHS ABS  --  1.93  --    MONOS ABS  --  0.81  --    EOS ABS  --  0.13  --    .3* 98.9*  --    PROTIME  --  13.4  --    APTT  --  20.0*  --          Lab 23  0730 23  1519 23  1517   SODIUM 140 142  --    POTASSIUM 3.5 3.3*  --    CHLORIDE 96* 96*  --    CO2 30.0* 27.0  --    ANION GAP 14.0 19.0*  --    BUN 9 7  --    CREATININE 0.75 0.76 0.70   EGFR 93.0 91.5 101.0   GLUCOSE 88 125*  --    CALCIUM 8.8 8.7  --    MAGNESIUM  --  1.8  --    HEMOGLOBIN A1C 5.50  --   --          Lab 23  0730 23  1519   TOTAL PROTEIN 6.3 7.2   ALBUMIN 3.9 3.9   GLOBULIN 2.4 3.3   ALT (SGPT) 92* 107*   AST (SGOT) 101* 124*   BILIRUBIN 0.8 0.6   ALK PHOS 194* 213*   LIPASE   --  24         Lab 04/18/23  1519   HSTROP T <6   PROTIME 13.4   INR 1.1         Lab 04/19/23  0730   CHOLESTEROL 198   LDL CHOL 120*   HDL CHOL 60   TRIGLYCERIDES 99             Brief Urine Lab Results     None          Microbiology Results Abnormal     None          CT Angiogram Neck    Result Date: 4/18/2023  CT ANGIOGRAM HEAD W AI ANALYSIS OF LVO, CT ANGIOGRAM NECK Date of Exam: 4/18/2023 3:13 PM EDT Indication: Neuro deficit, acute stroke suspected Neuro deficit, acute stroke suspected. Comparison: None available. Technique: CTA of the head was performed after the uneventful intravenous administration of contrast . Reconstructed coronal and sagittal images were also obtained. In addition, a 3-D volume rendered image was created for interpretation. Automated exposure control and iterative reconstruction methods were used. Findings: The aortic arch is normal in caliber. There is a bovine configuration to the aortic arch. The right brachiocephalic artery is widely patent. The right common carotid artery is patent. There is mild plaquing in the right carotid bulb. The right ICA is widely patent throughout its course. The right subclavian artery, right vertebral artery are widely patent throughout their course. The left common carotid artery is widely patent. The left carotid bulb demonstrates mild plaquing. The left ICA is widely patent. The left subclavian and left vertebral artery are widely patent throughout their course. The intracranial vertebral arteries are widely patent. The basilar artery is widely patent. There is a diminutive left P1 segment with a large left posterior communicating artery, a normal variant. Bilateral PCAs are widely patent. Bilateral ACAs and bilateral MCAs are widely patent. Mild changes of emphysema. The thyroid is normal. No adenopathy is identified. Parotid and submandibular glands are unremarkable. Normal gray and white matter differentiation. Ventricles and sulci are  age-appropriate. Orbits paranasal sinuses and mastoid  air cells are unremarkable. No aggressive appearing lytic or sclerotic bone lesions are identified.     Impression: Impression: 1. No intra or extracranial stenoses, aneurysms, or occlusions are identified. Electronically Signed: Bernabe Escobar  4/18/2023 3:35 PM EDT  Workstation ID: [id number]    US Gallbladder    Result Date: 4/19/2023  US GALLBLADDER Date of Exam: 4/19/2023 8:15 AM EDT Indication: elevated LFT's and NV. Comparison: CT abdomen pelvis 6/21/2014. Gallbladder ultrasound also 6/21/2014. Technique: Grayscale and color Doppler ultrasound evaluation of the right upper quadrant was performed. Findings: The liver is diffusely increased in echogenicity. There is coarsening of the liver echotexture and there is difficult sonographic penetration of the liver. There is no focal liver abnormality.  The portal vein and hepatic veins demonstrate normal directional flow and exhibit normal waveform on spectral imaging. The gallbladder is nondistended. There is no evidence of cholelithiasis. There is no evidence of acute cholecystitis. The common bile duct measures 5 mm diameter. The right kidney measures 8.7 x 4.8 x 6.0 cm. Kidney echogenicity and vascularity appear within normal limits.  There is no solid kidney mass, echogenic shadowing stone or hydronephrosis. Limited visualization of the pancreas and aorta is unremarkable. There is no significant ascites within the right upper quadrant.     Impression: Impression: 1. Increased liver echogenicity with coarsening of the echotexture likely reflecting hepatic steatosis or other chronic hepatocellular disease. 2. No acute findings. Electronically Signed: Rufino Cuevas  4/19/2023 8:48 AM EDT  Workstation ID: [id number]    XR Chest 1 View    Result Date: 4/18/2023  XR CHEST 1 VW Date of Exam: 4/18/2023 3:31 PM EDT Indication: Acute Stroke Protocol (onset < 12 hrs). Comparison: 9/13/2018 Findings: Heart size and  pulmonary vasculature within normal limits. Lungs clear. Costophrenic angles sharp     Impression: Impression: No active cardiopulmonary disease Electronically Signed: Bhavesh Toth  4/18/2023 3:40 PM EDT  Workstation ID: [id number]    CT Head Without Contrast Stroke Protocol    Result Date: 4/18/2023  CT HEAD WO CONTRAST STROKE PROTOCOL Date of Exam: 4/18/2023 3:08 PM EDT Indication: Neuro deficit, acute, stroke suspected Neuro deficit, acute stroke suspected. Comparison: None available. Technique: Axial CT images were obtained of the head without contrast administration.  Reconstructed coronal and sagittal images were also obtained. Automated exposure control and iterative construction methods were used. Scan Time: 3:08 p.m. Results discussed with stroke team at 3:12 p.m. Findings: Parenchyma:No acute intraparenchymal hemorrhage. No loss of gray-white differentiation to suggest large territory infarct. Normal parenchymal volume. No substantial white matter disease. No midline shift or herniation. Ventricles and extra axial spaces:Normal caliber of ventricles and sulci. No extra axial fluid collection seen. Other:Orbits are grossly intact. Scattered paranasal sinus mucosal thickening. Mastoid air cells are clear. Calvarium is intact. No substantial intracranial atherosclerotic calcification.     Impression: Impression: No evidence of acute intracranial hemorrhage or large territory infarct. Electronically Signed: Conrad Hwang  4/18/2023 3:21 PM EDT  Workstation ID: [id number]    CT Angiogram Head w AI Analysis of LVO    Result Date: 4/18/2023  CT ANGIOGRAM HEAD W AI ANALYSIS OF LVO, CT ANGIOGRAM NECK Date of Exam: 4/18/2023 3:13 PM EDT Indication: Neuro deficit, acute stroke suspected Neuro deficit, acute stroke suspected. Comparison: None available. Technique: CTA of the head was performed after the uneventful intravenous administration of contrast . Reconstructed coronal and sagittal images were also obtained. In  addition, a 3-D volume rendered image was created for interpretation. Automated exposure control and iterative reconstruction methods were used. Findings: The aortic arch is normal in caliber. There is a bovine configuration to the aortic arch. The right brachiocephalic artery is widely patent. The right common carotid artery is patent. There is mild plaquing in the right carotid bulb. The right ICA is widely patent throughout its course. The right subclavian artery, right vertebral artery are widely patent throughout their course. The left common carotid artery is widely patent. The left carotid bulb demonstrates mild plaquing. The left ICA is widely patent. The left subclavian and left vertebral artery are widely patent throughout their course. The intracranial vertebral arteries are widely patent. The basilar artery is widely patent. There is a diminutive left P1 segment with a large left posterior communicating artery, a normal variant. Bilateral PCAs are widely patent. Bilateral ACAs and bilateral MCAs are widely patent. Mild changes of emphysema. The thyroid is normal. No adenopathy is identified. Parotid and submandibular glands are unremarkable. Normal gray and white matter differentiation. Ventricles and sulci are age-appropriate. Orbits paranasal sinuses and mastoid  air cells are unremarkable. No aggressive appearing lytic or sclerotic bone lesions are identified.     Impression: Impression: 1. No intra or extracranial stenoses, aneurysms, or occlusions are identified. Electronically Signed: Bernabe Escobar  4/18/2023 3:35 PM EDT  Workstation ID: [id number]    CT CEREBRAL PERFUSION WITH & WITHOUT CONTRAST    Result Date: 4/18/2023  CT CEREBRAL PERFUSION W WO CONTRAST Date of Exam: 4/18/2023 3:13 PM EDT Indication: Neuro deficit, acute stroke suspected.  Comparison: CT head from earlier today Technique: Axial CT images of the brain were obtained prior to and after the administration of 50 mL Isovue-370. Core  blood volume, core blood flow, mean transit time, and Tmax images were obtained utilizing the Rapid software protocol. A limited CT angiogram of the head was also performed to measure the blood vessel density. The radiation dose reduction device was turned on for each scan per the ALARA (As Low as Reasonably Achievable) protocol. Findings: The intracranial cerebral perfusion appears normal.     Impression: Impression: Examination appears within normal limits. Electronically Signed: Vicente Abhishek  4/18/2023 3:29 PM EDT  Workstation ID: [id number]          Current medications:  Scheduled Meds:aspirin, 325 mg, Oral, Daily   Or  aspirin, 300 mg, Rectal, Daily  escitalopram, 20 mg, Oral, Daily  pantoprazole, 40 mg, Oral, Q AM  potassium chloride, 40 mEq, Oral, Q4H  sodium chloride, 10 mL, Intravenous, Q12H      Continuous Infusions:   PRN Meds:.•  acetaminophen **OR** acetaminophen **OR** acetaminophen  •  Calcium Replacement - Follow Nurse / BPA Driven Protocol  •  Magnesium Standard Dose Replacement - Follow Nurse / BPA Driven Protocol  •  ondansetron **OR** ondansetron  •  Phosphorus Replacement - Follow Nurse / BPA Driven Protocol  •  Potassium Replacement - Follow Nurse / BPA Driven Protocol  •  sodium chloride  •  sodium chloride    Assessment & Plan   Assessment & Plan     Active Hospital Problems    Diagnosis  POA   • **Numbness and tingling of right upper extremity [R20.0, R20.2]  Yes   • Hypokalemia [E87.6]  Yes   • GERD (gastroesophageal reflux disease) [K21.9]  Yes   • HLD (hyperlipidemia) [E78.5]  Yes   • Elevated blood-pressure reading without diagnosis of hypertension [R03.0]  Yes   • Elevated LFTs [R79.89]  Unknown      Resolved Hospital Problems   No resolved problems to display.        Brief Hospital Course to date:  Radha Armstrong is a 57 y.o. female with hx of HLD, anxiety, depression, and previous tobacco abuse who presents due to 2 week history of head pressure and episodes of whole body shaking. She  then woke up on 4/18/23 with right arm/hand numbness. BP elevated at 196/62 on arrival to the ER, she does not have a diagnosis of HTN. CT head, CT perfusion, CTA head/neck were negative. Admitted for further workup and management.    This patient's problems and plans were partially entered by my partner and updated as appropriate by me 04/19/23.    *All problems are new to me today.    Numbness and tingling of RUE  Transient neurologic symptoms   --CT head and CT perfusion negative  --CTA head/neck negative  --Stroke Neurology team following  --Possible TIA, stroke, or hypertensive in etiology   --MRI brain pending  --Echo pending   --EEG pending  --, HbA1c 5.5%  --Continue ASA, statin  --PT/OT/SLP evaluations completed, seems to be at baseline, no further therapy needs at this time      Elevated blood pressure without HTN diagnosis   --BP running 150 systolic for the most part  --Will start on Metoprolol 12.5 mg BID  --Patient will need to keep a BP log and F/U with PCP closely for further monitoring and medication adjustments    Elevated LFT's  Hepatic steatosis   --Last known normal labs in 2018  --Gallbladder ultrasound overall unremarkable, does show hepatic steatosis  --Negative acute hepatitis panel  --LFT's trending down today  --Will recommend close PCP follow up with repeat LFT's     Hypokalemia   --Replace per protocol    Anxiety  Depression  --Continue Lexapro     GERD  --Continue PPI     Expected Discharge Location and Transportation: home  Expected Discharge later today vs tomorrow pending MRI and EEG results  Expected Discharge Date and Time     Expected Discharge Date Expected Discharge Time    Apr 20, 2023            DVT prophylaxis:  Mechanical DVT prophylaxis orders are present.     AM-PAC 6 Clicks Score (PT): 24 (04/19/23 1104)    CODE STATUS:   Code Status and Medical Interventions:   Ordered at: 04/18/23 1042     Level Of Support Discussed With:    Patient     Code Status (Patient has  no pulse and is not breathing):    CPR (Attempt to Resuscitate)     Medical Interventions (Patient has pulse or is breathing):    Full Support       Valentina Chapman MD  04/19/23

## 2023-04-19 NOTE — PLAN OF CARE
Goal Outcome Evaluation:  Plan of Care Reviewed With: patient    SLP evaluation completed. Will sign-off cognitive communication. Please see note for further details and recommendations.

## 2023-04-19 NOTE — PLAN OF CARE
Problem: Adult Inpatient Plan of Care  Goal: Plan of Care Review  Recent Flowsheet Documentation  Taken 4/19/2023 0814 by Dann Giang, OT  Progress: no change  Plan of Care Reviewed With:   patient   family  Outcome Evaluation: Pt presents at baseline for ADL completion with symmetrical BUE strength and coordination intact. Mild R hand sensory deficit, Pt reports it continues to resolve, WFL for safe ADL performance. OT signing off, please reconsult if needed. Rec d/c to home when medically appropriate.

## 2023-04-19 NOTE — PLAN OF CARE
Problem: Adult Inpatient Plan of Care  Goal: Plan of Care Review  Recent Flowsheet Documentation  Patient presents w/ good (symmetrical) strength, balance, and is independent w/ mobility. She ambulated in hallway participating in dynamic gait activities w/ no LOB or instability. No further acute PT services indicated at this time. Anticipate safe D/C home when medically appropriate.

## 2023-04-19 NOTE — THERAPY DISCHARGE NOTE
Acute Care - Occupational Therapy Discharge  [facility]    Patient Name: Radha Armstrong  : 1965    MRN: [id number]                              Today's Date: 2023       Admit Date: 2023    Visit Dx:     ICD-10-CM ICD-9-CM   1. Numbness and tingling of right upper extremity  R20.0 782.0    R20.2    2. Hypertension, unspecified type  I10 401.9   3. Hypokalemia  E87.6 276.8     Patient Active Problem List   Diagnosis   • Hypertension   • Numbness and tingling of right upper extremity   • Hypokalemia   • GERD (gastroesophageal reflux disease)   • HLD (hyperlipidemia)   • Elevated blood-pressure reading without diagnosis of hypertension   • Elevated LFTs     Past Medical History:   Diagnosis Date   • Depression    • Hyperlipidemia      Past Surgical History:   Procedure Laterality Date   • ABDOMINAL SURGERY     • TONSILLECTOMY        General Information     Row Name 23 0804          OT Time and Intention    Document Type discharge evaluation/summary  -CS     Mode of Treatment occupational therapy  -CS     Row Name 23 0804          General Information    Patient Profile Reviewed yes  -CS     Prior Level of Function all household mobility;independent:;ADL's;home management;driving;work;using stairs  No AD/DME reported at baseline for ADL completion or related mobility.  -CS     Existing Precautions/Restrictions other (see comments)  RUE sensory deficit  -CS     Barriers to Rehab none identified  -CS     Row Name 23 0804          Living Environment    People in Home spouse  -CS     Row Name 2304          Home Main Entrance    Number of Stairs, Main Entrance three  -CS     Row Name 2304          Stairs Within Home, Primary    Stairs, Within Home, Primary 2 level home over basement, master bed/bath on main floor  -CS     Number of Stairs, Within Home, Primary twelve;other (see comments)  -CS     Row Name 2304          Cognition    Orientation Status (Cognition)  oriented x 4  -     Row Name 04/19/23 0804          Safety Issues, Functional Mobility    Impairments Affecting Function (Mobility) sensation/sensory awareness  -CS     Comment, Safety Issues/Impairments (Mobility) mild (resolving) RUE sensory deficit (distal > proximal)  -CS           User Key  (r) = Recorded By, (t) = Taken By, (c) = Cosigned By    Initials Name Provider Type     Dann Giang, OT Occupational Therapist               Mobility/ADL's     Row Name 04/19/23 0807          Bed Mobility    Bed Mobility supine-sit;scooting/bridging  -CS     Scooting/Bridging [place] (Bed Mobility) independent  -CS     Supine-Sit [place] (Bed Mobility) modified independence  -CS     Assistive Device (Bed Mobility) head of bed elevated  -CS     Comment, (Bed Mobility) No dizziness reported upon sitting, appropriate sequencing intact  -     Row Name 04/19/23 0807          Transfers    Transfers sit-stand transfer;toilet transfer;bed-chair transfer  -CS     Comment, (Transfers) no AD, no LOB, no dizziness reported  -     Row Name 04/19/23 0807          Bed-Chair Transfer    Bed-Chair [place] (Transfers) independent  -     Row Name 04/19/23 08          Sit-Stand Transfer    Sit-Stand [place] (Transfers) independent  -     Row Name 04/19/23 0807          Toilet Transfer    Type (Toilet Transfer) stand-sit;sit-stand  -     [place] Level (Toilet Transfer) independent  -     Row Name 04/19/23 08          Functional Mobility    Functional Mobility- Ind. Level independent  -     Functional Mobility- Comment no AD, no LOB, distance to toilet, sinkside, and transportstretcher  -     Row Name 04/19/23 0807          Activities of Daily Living    BADL Assessment/Intervention lower body dressing;upper body dressing;grooming;toileting;feeding  -     Row Name 04/19/23 0807          Lower Body Dressing Assessment/Training    [place] Level (Lower Body Dressing) lower body dressing  skills;independent;socks  -CS     Position (Lower Body Dressing) edge of bed sitting  -CS     Comment, (Lower Body Dressing) reach to distal BLEs intact  -CS     Row Name 04/19/23 0807          Upper Body Dressing Assessment/Training    [place] Level (Upper Body Dressing) upper body dressing skills;independent  -CS     Position (Upper Body Dressing) edge of bed sitting  -CS     Row Name 04/19/23 0807          Grooming Assessment/Training    [place] Level (Grooming) wash face, hands;independent  -CS     Position (Grooming) sink side  -CS     Row Name 04/19/23 0807          Toileting Assessment/Training    [place] Level (Toileting) adjust/manage clothing;toileting skills;perform perineal hygiene;independent  -CS     Position (Toileting) unsupported sitting;unsupported standing  -CS     Row Name 04/19/23 0807          Self-Feeding Assessment/Training    [place] Level (Feeding) other (see comments)  -CS     Comment, (Feeding) NPO awaiting procedure/test  -           User Key  (r) = Recorded By, (t) = Taken By, (c) = Cosigned By    Initials Name Provider Type    CS Dann Giang, OT Occupational Therapist               Obj/Interventions     [facility] Name 04/19/23 0811          Sensory Assessment (Somatosensory)    Sensory Assessment (Somatosensory) UE sensation intact  -     Sensory Assessment Pt reports mild tingling remain at fingertips, otherwise return to baseline, identified all lt touch stimuli presented bilaterally  -     Row Name 04/19/23 0811          Vision Assessment/Intervention    Visual Impairment/Limitations corrective lenses full-time;WFL  -     Vision Assessment Comment full to confrontation  -     Row Name 04/19/23 0811          Range of Motion Comprehensive    General Range of Motion no range of motion deficits identified  -[facility] Name 04/19/23 0811          Strength Comprehensive (MMT)    General Manual Muscle Testing (MMT) Assessment no strength deficits identified   -CS     Comment, General Manual Muscle Testing (MMT) Assessment BUE grossly 5/5, no significant asymmetry observed, R hand dominant  -CS     Row Name 04/19/23 0811          Motor Skills    Motor Skills coordination;functional endurance  -CS     Coordination bilateral;upper extremity;finger to nose;bimanual skills;WFL;other (see comments)  finger to nose performed in standing w/ vision occluded  -CS     Functional Endurance O2 sats stable on RA  -CS     Row Name 04/19/23 0811          Balance    Balance Assessment sitting static balance;sitting dynamic balance;standing static balance;standing dynamic balance  -CS     Static Sitting Balance independent  -CS     Dynamic Sitting Balance independent  -CS     Position, Sitting Balance unsupported;sitting edge of bed  -CS     Static Standing Balance independent  -CS     Dynamic Standing Balance independent  -CS     Position/Device Used, Standing Balance unsupported  -CS     Balance Interventions sitting;sit to stand;standing;occupation based/functional task;UE activity with balance activity  -CS     Comment, Balance no LOB during ADL completion or related mobility  -CS           User Key  (r) = Recorded By, (t) = Taken By, (c) = Cosigned By    Initials Name Provider Type    Dann Mae, OT Occupational Therapist               Goals/Plan    No documentation.                Clinical Impression     Row Name 04/19/23 0814          Pain Assessment    Pretreatment Pain Rating 0/10 - no pain  -CS     Posttreatment Pain Rating 0/10 - no pain  -CS     Row Name 04/19/23 0814          Plan of Care Review    Plan of Care Reviewed With patient;family  -CS     Progress no change  -CS     Outcome Evaluation Pt presents at baseline for ADL completion with symmetrical BUE strength and coordination intact. Mild R hand sensory deficit, Pt reports it continues to resolve, WFL for safe ADL performance. OT signing off, please reconsult if needed. Rec d/c to home when medically  appropriate.  -CS     Row Name 04/19/23 0814          Therapy Assessment/Plan (OT)    Patient/Family Therapy Goal Statement (OT) return home  -CS     Criteria for Skilled Therapeutic Interventions Met (OT) no;no problems identified which require skilled intervention  -CS     Therapy Frequency (OT) evaluation only  -CS     Row Name 04/19/23 0814          Therapy Plan Review/Discharge Plan (OT)    Anticipated Discharge Disposition (OT) home  -CS     Row Name 04/19/23 0814          Vital Signs    Pre Systolic BP Rehab 139  RN cleared for eval, VSS on RA  -CS     Pre Treatment Diastolic BP 83  -CS     Post Treatment Diastolic BP --  Transport stretcher arrived, unable to get post BP  -CS     Pre SpO2 (%) 98  -CS     O2 Delivery Pre Treatment room air  -CS     O2 Delivery Intra Treatment room air  -CS     O2 Delivery Post Treatment room air  -CS     Pre Patient Position Supine  -CS     Intra Patient Position Standing  -CS     Post Patient Position Supine  -CS     Row Name 04/19/23 0814          Positioning and Restraints    Pre-Treatment Position in bed  -CS     Post Treatment Position other  -CS     Other Position with other staff  transport stretcher  -CS           User Key  (r) = Recorded By, (t) = Taken By, (c) = Cosigned By    Initials Name Provider Type    CS Dann Giang, OT Occupational Therapist               Outcome Measures     Row Name 04/19/23 0818          How much help from another is currently needed...    Putting on and taking off regular lower body clothing? 4  -CS     Bathing (including washing, rinsing, and drying) 4  -CS     Toileting (which includes using toilet bed pan or urinal) 4  -CS     Putting on and taking off regular upper body clothing 4  -CS     Taking care of personal grooming (such as brushing teeth) 4  -CS     Eating meals 4  -CS     AM-PAC 6 Clicks Score (OT) 24  -CS     Row Name 04/19/23 0818          Modified Cannon Scale    Modified Virgil Scale 1 - No significant disability  despite symptoms.  Able to carry out all usual duties and activities.  -     Row Name 04/19/23 0818          Functional Assessment    Outcome Measure Options AM-PAC 6 Clicks Daily Activity (OT);Modified [place]  -           User Key  (r) = Recorded By, (t) = Taken By, (c) = Cosigned By    Initials Name Provider Type    CS Dann Giang OT Occupational Therapist              Occupational Therapy Education     Title: PT OT SLP Therapies (Done)     Topic: Occupational Therapy (Done)     Point: Precautions (Done)     Description:   Instruct learner(s) on prescribed precautions during self-care and functional transfers.              Learning Progress Summary           Patient Acceptance, E,D, DU,VU by  at 4/19/2023 0819    Comment: in room safety awareness, stroke education (symptom awareness)   Family Acceptance, E,D, DU,VU by  at 4/19/2023 0819    Comment: in room safety awareness, stroke education (symptom awareness)                               User Key     Initials Effective Dates Name Provider Type Discipline     06/16/21 -  Dann Giang, MANNY Occupational Therapist OT              OT Recommendation and Plan  Therapy Frequency (OT): evaluation only  Plan of Care Review  Plan of Care Reviewed With: patient, family  Progress: no change  Outcome Evaluation: Pt presents at baseline for ADL completion with symmetrical BUE strength and coordination intact. Mild R hand sensory deficit, Pt reports it continues to resolve, WFL for safe ADL performance. OT signing off, please reconsult if needed. Rec d/c to home when medically appropriate.  Plan of Care Reviewed With: patient, family  Outcome Evaluation: Pt presents at baseline for ADL completion with symmetrical BUE strength and coordination intact. Mild R hand sensory deficit, Pt reports it continues to resolve, WFL for safe ADL performance. OT signing off, please reconsult if needed. Rec d/c to home when medically appropriate.     Time Calculation:    Time  Calculation- OT     Row Name 04/19/23 0819             Time Calculation- OT    OT Start Time 0738  -CS      OT Received On 04/19/23  -CS         Untimed Charges    OT Eval/Re-eval Minutes 41  -CS         Total Minutes    Untimed Charges Total Minutes 41  -CS       Total Minutes 41  -CS            User Key  (r) = Recorded By, (t) = Taken By, (c) = Cosigned By    Initials Name Provider Type    CS Dann Giang OT Occupational Therapist              Therapy Charges for Today     Code Description Service Date Service Provider Modifiers Qty    16492613983  OT EVAL LOW COMPLEXITY 3 4/19/2023 Dann Giang OT GO 1             OT Discharge Summary  Anticipated Discharge Disposition (OT): home  Reason for Discharge: At baseline function, Independent, other (comment) (ADLs)  Discharge Destination: Home    Dann Giang OT  4/19/2023

## 2023-04-19 NOTE — THERAPY TREATMENT NOTE
Patient Name: Radha Armstrong  : 1965    MRN: [id number]                              Today's Date: 2023       Admit Date: 2023    Visit Dx:     ICD-10-CM ICD-9-CM   1. Numbness and tingling of right upper extremity  R20.0 782.0    R20.2    2. Hypertension, unspecified type  I10 401.9   3. Hypokalemia  E87.6 276.8     Patient Active Problem List   Diagnosis   • Hypertension   • Numbness and tingling of right upper extremity   • Hypokalemia   • GERD (gastroesophageal reflux disease)   • HLD (hyperlipidemia)   • Elevated blood-pressure reading without diagnosis of hypertension   • Elevated LFTs     Past Medical History:   Diagnosis Date   • Depression    • Hyperlipidemia      Past Surgical History:   Procedure Laterality Date   • ABDOMINAL SURGERY     • TONSILLECTOMY        General Information     Row Name 23 0945          Physical Therapy Time and Intention    Document Type evaluation;discharge evaluation/summary  -MB     Mode of Treatment physical therapy  -MB     Row Name 23 0945          General Information    Patient Profile Reviewed yes  -MB     Prior Level of Function independent:;bed mobility;ADL's;transfer;gait;all household mobility;community mobility;using stairs;driving  -MB     Existing Precautions/Restrictions no known precautions/restrictions  -MB     Barriers to Rehab none identified  -MB     Row Name 23 0945          Living Environment    People in Home spouse  -MB     Row Name 23 0945          Home Main Entrance    Number of Stairs, Main Entrance three  -MB     Stair Railings, Main Entrance none  -MB     Row Name 23 0945          Stairs Within Home, Primary    Stairs, Within Home, Primary Bedroom on main floor.  -MB     Number of Stairs, Within Home, Primary twelve  -MB     Stair Railings, Within Home, Primary railings on both sides of stairs  -MB     Row Name 23 0945          Cognition    Orientation Status (Cognition) oriented x 4  -MB     Row Name  Respiratory note:

PT EXTUBATED AND PLACED ON COOL AEROSOL MASK 45% FIO2.  BS CLEAR/DIMINISHED, SPO2 94%, HR 
112, RR 28.  WILL ENDORSE PT STATUS TO NIGHT SHIFT. 04/19/23 0945          Safety Issues, Functional Mobility    Impairments Affecting Function (Mobility) sensation/sensory awareness  -MB           User Key  (r) = Recorded By, (t) = Taken By, (c) = Cosigned By    Initials Name Provider Type    Lexy Sidhu, PT Physical Therapist               Mobility     [facility] Name 04/19/23 1039          Bed Mobility    Comment, (Bed Mobility) Pt. received and left sitting UIC.  -[facility] Name 04/19/23 1039          Transfers    Comment, (Transfers) Pt. demo safe and appropriate hand placement w/ no LOB or instability.  -MB     Row Name 04/19/23 1039          Sit-Stand Transfer    Sit-Stand [place] (Transfers) independent  -[facility] Name 04/19/23 1039          Gait/Stairs (Locomotion)    [place] Level (Gait) independent  -MB     Distance in Feet (Gait) 500  -MB     [place] Level (Stairs) not tested  -MB     Comment, (Gait/Stairs) Pt. ambulated w/ step through gait mechanics, kaleigh WNL. Pt. performed lateral head rotation, stepped over objects, adjusted gait speed, and navigated obstacles w/ no LOB or instability.  -MB           User Key  (r) = Recorded By, (t) = Taken By, (c) = Cosigned By    Initials Name Provider Type    Lexy Sidhu, PT Physical Therapist               Obj/Interventions     [facility] Name 04/19/23 1042          Range of Motion Comprehensive    General Range of Motion no range of motion deficits identified  -MB     Row Name 04/19/23 1042          Strength Comprehensive (MMT)    General Manual Muscle Testing (MMT) Assessment no strength deficits identified  -MB     Comment, General Manual Muscle Testing (MMT) Assessment BLEs 5/5, symmetrical  -[facility] Name 04/19/23 1042          Motor Skills    Motor Skills coordination  -MB     Coordination bilateral;lower extremity;heel to shin;WNL  -[facility] Name 04/19/23 1042          Balance    Balance Assessment sitting static balance;standing static balance;standing dynamic balance  -MB      Static Sitting Balance independent  -MB     Dynamic Sitting Balance independent  -MB     Position, Sitting Balance unsupported;sitting in chair  -MB     Static Standing Balance independent  -MB     Dynamic Standing Balance independent  -MB     Position/Device Used, Standing Balance unsupported  -MB     Balance Interventions standing;sit to stand;highly challenging;dynamic reaching;narrowed base of support;single limb stance;step overs;tandem standing;weight shifting activity;manual resistance applied during activity;UE activity with balance activity  -MB     Row Name 04/19/23 1042          Sensory Assessment (Somatosensory)    Sensory Assessment (Somatosensory) LE sensation intact  -MB           User Key  (r) = Recorded By, (t) = Taken By, (c) = Cosigned By    Initials Name Provider Type    Lexy Sidhu, PT Physical Therapist               Goals/Plan    No documentation.                Clinical Impression     Row Name 04/19/23 1046          Pain    Pretreatment Pain Rating 0/10 - no pain  -MB     Posttreatment Pain Rating 0/10 - no pain  -MB     Row Name 04/19/23 1046          Plan of Care Review    Plan of Care Reviewed With patient;family  -MB     Progress no change  -MB     Outcome Evaluation Patient presents w/ good (symmetrical) strength, balance, and is independent w/ mobility. She ambulated in hallway participating in dynamic gait activities w/ no LOB or instability. No further acute PT servies indicated at this time. Anticipate safe D/C home when medically appropriate.  -MB     Row Name 04/19/23 1046          Therapy Assessment/Plan (PT)    Patient/Family Therapy Goals Statement (PT) Return to home and PLOF.  -MB     Criteria for Skilled Interventions Met (PT) no;no problems identified which require skilled intervention  -MB     Therapy Frequency (PT) evaluation only  -MB     Row Name 04/19/23 1046          Vital Signs    Pre Systolic BP Rehab 139  -MB     Pre Treatment Diastolic BP 83  -MB      Post Systolic BP Rehab 153  -MB     Post Treatment Diastolic BP 87  -MB     Pre SpO2 (%) 98  -MB     O2 Delivery Pre Treatment room air  -MB     O2 Delivery Intra Treatment room air  -MB     O2 Delivery Post Treatment room air  -MB     Pre Patient Position Sitting  -MB     Intra Patient Position Standing  -MB     Post Patient Position Sitting  -MB     Row Name 04/19/23 1046          Positioning and Restraints    Pre-Treatment Position sitting in chair/recliner  -MB     Post Treatment Position chair  -MB     In Chair notified nsg;reclined;call light within reach;encouraged to call for assist;with family/caregiver;legs elevated  -MB           User Key  (r) = Recorded By, (t) = Taken By, (c) = Cosigned By    Initials Name Provider Type    Lexy Sidhu, PT Physical Therapist               Outcome Measures     Row Name 04/19/23 1104          How much help from another person do you currently need...    Turning from your back to your side while in flat bed without using bedrails? 4  -MB     Moving from lying on back to sitting on the side of a flat bed without bedrails? 4  -MB     Moving to and from a bed to a chair (including a wheelchair)? 4  -MB     Standing up from a chair using your arms (e.g., wheelchair, bedside chair)? 4  -MB     Climbing 3-5 steps with a railing? 4  -MB     To walk in hospital room? 4  -MB     AM-PAC 6 Clicks Score (PT) 24  -MB     Highest level of mobility 8 --> Walked 250 feet or more  -MB     Row Name 04/19/23 1104 04/19/23 0818       Modified Virgil Scale    Modified [place] Scale 1 - No significant disability despite symptoms.  Able to carry out all usual duties and activities.  -MB 1 - No significant disability despite symptoms.  Able to carry out all usual duties and activities.  -CS    Row Name 04/19/23 1104 04/19/23 0818       Functional Assessment    Outcome Measure Options AM-PAC 6 Clicks Basic Mobility (PT);Modified [place]  -MB AM-PAC 6 Clicks Daily Activity (OT);Modified  Virgil DODSON          User Key  (r) = Recorded By, (t) = Taken By, (c) = Cosigned By    Initials Name Provider Type    Lexy Sidhu, PT Physical Therapist    Dann Mae, OT Occupational Therapist                             Physical Therapy Education     Title: PT OT SLP Therapies (Done)     Topic: Physical Therapy (Done)     Point: Mobility training (Done)     Learning Progress Summary           Patient Acceptance, E,D, VU,DU by MB at 4/19/2023 1105    Comment: Provided educ. re: home safety, fall precautions, benefits of mobility, gait safety.   Family Acceptance, E,D, VU,DU by MB at 4/19/2023 1105    Comment: Provided educ. re: home safety, fall precautions, benefits of mobility, gait safety.                   Point: Home exercise program (Done)     Learning Progress Summary           Patient Acceptance, E,D, VU,DU by MB at 4/19/2023 1105    Comment: Provided educ. re: home safety, fall precautions, benefits of mobility, gait safety.   Family Acceptance, E,D, VU,DU by MB at 4/19/2023 1105    Comment: Provided educ. re: home safety, fall precautions, benefits of mobility, gait safety.                   Point: Body mechanics (Done)     Learning Progress Summary           Patient Acceptance, E,D, VU,DU by MB at 4/19/2023 1105    Comment: Provided educ. re: home safety, fall precautions, benefits of mobility, gait safety.   Family Acceptance, E,D, VU,DU by MB at 4/19/2023 1105    Comment: Provided educ. re: home safety, fall precautions, benefits of mobility, gait safety.                   Point: Precautions (Done)     Learning Progress Summary           Patient Acceptance, E,D, VU,DU by MB at 4/19/2023 1105    Comment: Provided educ. re: home safety, fall precautions, benefits of mobility, gait safety.   Family Acceptance, E,D, VU,DU by MB at 4/19/2023 1105    Comment: Provided educ. re: home safety, fall precautions, benefits of mobility, gait safety.                               User Key      Initials Effective Dates Name Provider Type Discipline    MB 06/16/21 -  Lexy Robbins, PT Physical Therapist PT              PT Recommendation and Plan     Plan of Care Reviewed With: patient, family  Progress: no change  Outcome Evaluation: Patient presents w/ good (symmetrical) strength, balance, and is independent w/ mobility. She ambulated in hallway participating in dynamic gait activities w/ no LOB or instability. No further acute PT servies indicated at this time. Anticipate safe D/C home when medically appropriate.     Time Calculation:    PT Charges     Row Name 04/19/23 1109             Time Calculation    Start Time 0945  -MB      PT Received On 04/19/23  -MB         Untimed Charges    PT Eval/Re-eval Minutes 46  -MB         Total Minutes    Untimed Charges Total Minutes 46  -MB       Total Minutes 46  -MB            User Key  (r) = Recorded By, (t) = Taken By, (c) = Cosigned By    Initials Name Provider Type    Lexy Sidhu, PT Physical Therapist              Therapy Charges for Today     Code Description Service Date Service Provider Modifiers Qty    48898681277 HC PT EVAL LOW COMPLEXITY 4 4/19/2023 Lexy Robbins, PT GP 1          PT G-Codes  Outcome Measure Options: AM-PAC 6 Clicks Basic Mobility (PT), Modified [place]  AM-PAC 6 Clicks Score (PT): 24  AM-PAC 6 Clicks Score (OT): 24  Modified [place] Scale: 1 - No significant disability despite symptoms.  Able to carry out all usual duties and activities.  PT Discharge Summary  Anticipated Discharge Disposition (PT): home with assist    Lexy Robbins PT  4/19/2023

## 2023-04-19 NOTE — PLAN OF CARE
Goal Outcome Evaluation:  Plan of Care Reviewed With: patient           Outcome Evaluation: PT ADMITTED TO FLOOR WITH DX OF STROKE NUMBNESS IN RIGHT HAND.  PT IS ALERT AND ORIENTED X 4.  PT IS ON ROOM AIR WITH #18 IN THE RIGHT AC.  PT IS A STANDBY ASSIST.  VSS, HR NSR.  NO ACUTE DISTRESS NOTED AT THIS TIME.  WILL CONT TO MONITOR FOR CHANGES.

## 2023-04-20 ENCOUNTER — APPOINTMENT (OUTPATIENT)
Dept: CARDIOLOGY | Facility: HOSPITAL | Age: 58
End: 2023-04-20
Payer: COMMERCIAL

## 2023-04-20 PROBLEM — R20.2 NUMBNESS AND TINGLING OF RIGHT UPPER EXTREMITY: Status: RESOLVED | Noted: 2023-04-18 | Resolved: 2023-04-20

## 2023-04-20 PROBLEM — R20.0 NUMBNESS AND TINGLING OF RIGHT UPPER EXTREMITY: Status: RESOLVED | Noted: 2023-04-18 | Resolved: 2023-04-20

## 2023-04-20 PROBLEM — G45.9 TRANSIENT ISCHEMIC ATTACK (TIA): Status: ACTIVE | Noted: 2023-04-18

## 2023-04-20 PROBLEM — E87.6 HYPOKALEMIA: Status: RESOLVED | Noted: 2023-04-18 | Resolved: 2023-04-20

## 2023-04-20 LAB
ALBUMIN SERPL-MCNC: 4.1 G/DL (ref 3.5–5.2)
ALBUMIN/GLOB SERPL: 1.5 G/DL
ALP SERPL-CCNC: 211 U/L (ref 39–117)
ALT SERPL W P-5'-P-CCNC: 119 U/L (ref 1–33)
ANION GAP SERPL CALCULATED.3IONS-SCNC: 16 MMOL/L (ref 5–15)
AST SERPL-CCNC: 142 U/L (ref 1–32)
BH CV ECHO MEAS - AO MAX PG: 4.8 MMHG
BH CV ECHO MEAS - AO MEAN PG: 2 MMHG
BH CV ECHO MEAS - AO ROOT DIAM: 2.6 CM
BH CV ECHO MEAS - AO V2 MAX: 109 CM/SEC
BH CV ECHO MEAS - AO V2 VTI: 23.4 CM
BH CV ECHO MEAS - AVA(I,D): 2.6 CM2
BH CV ECHO MEAS - EDV(CUBED): 97.3 ML
BH CV ECHO MEAS - EDV(MOD-SP2): 83.1 ML
BH CV ECHO MEAS - EDV(MOD-SP4): 85.8 ML
BH CV ECHO MEAS - EF(MOD-BP): 56.6 %
BH CV ECHO MEAS - EF(MOD-SP2): 57.4 %
BH CV ECHO MEAS - EF(MOD-SP4): 55.5 %
BH CV ECHO MEAS - ESV(CUBED): 27 ML
BH CV ECHO MEAS - ESV(MOD-SP2): 35.4 ML
BH CV ECHO MEAS - ESV(MOD-SP4): 38.2 ML
BH CV ECHO MEAS - FS: 34.8 %
BH CV ECHO MEAS - IVS/LVPW: 0.9 CM
BH CV ECHO MEAS - IVSD: 0.9 CM
BH CV ECHO MEAS - LA DIMENSION: 3.7 CM
BH CV ECHO MEAS - LAT PEAK E' VEL: 10.3 CM/SEC
BH CV ECHO MEAS - LV DIASTOLIC VOL/BSA (35-75): 42.1 CM2
BH CV ECHO MEAS - LV MASS(C)D: 148.1 GRAMS
BH CV ECHO MEAS - LV MAX PG: 3.5 MMHG
BH CV ECHO MEAS - LV MEAN PG: 2 MMHG
BH CV ECHO MEAS - LV SYSTOLIC VOL/BSA (12-30): 18.7 CM2
BH CV ECHO MEAS - LV V1 MAX: 94 CM/SEC
BH CV ECHO MEAS - LV V1 VTI: 19.7 CM
BH CV ECHO MEAS - LVIDD: 4.6 CM
BH CV ECHO MEAS - LVIDS: 3 CM
BH CV ECHO MEAS - LVOT AREA: 3.1 CM2
BH CV ECHO MEAS - LVOT DIAM: 2 CM
BH CV ECHO MEAS - LVPWD: 1 CM
BH CV ECHO MEAS - MED PEAK E' VEL: 11.3 CM/SEC
BH CV ECHO MEAS - MV A MAX VEL: 69.6 CM/SEC
BH CV ECHO MEAS - MV DEC SLOPE: 191 CM/SEC2
BH CV ECHO MEAS - MV DEC TIME: 0.31 MSEC
BH CV ECHO MEAS - MV E MAX VEL: 58.5 CM/SEC
BH CV ECHO MEAS - MV E/A: 0.84
BH CV ECHO MEAS - PA ACC TIME: 0.12 SEC
BH CV ECHO MEAS - PA PR(ACCEL): 26.8 MMHG
BH CV ECHO MEAS - RAP SYSTOLE: 3 MMHG
BH CV ECHO MEAS - RVSP: 14 MMHG
BH CV ECHO MEAS - SI(MOD-SP2): 23.4 ML/M2
BH CV ECHO MEAS - SI(MOD-SP4): 23.3 ML/M2
BH CV ECHO MEAS - SV(LVOT): 61.9 ML
BH CV ECHO MEAS - SV(MOD-SP2): 47.7 ML
BH CV ECHO MEAS - SV(MOD-SP4): 47.6 ML
BH CV ECHO MEAS - TAPSE (>1.6): 1.48 CM
BH CV ECHO MEAS - TR MAX PG: 11.2 MMHG
BH CV ECHO MEAS - TR MAX VEL: 167 CM/SEC
BH CV ECHO MEASUREMENTS AVERAGE E/E' RATIO: 5.42
BH CV ECHO SHUNT ASSESSMENT PERFORMED (HIDDEN SCRIPTING): 1
BH CV VAS BP LEFT ARM: NORMAL MMHG
BH CV XLRA - RV BASE: 3.7 CM
BH CV XLRA - RV LENGTH: 6.2 CM
BH CV XLRA - RV MID: 2.7 CM
BH CV XLRA - TDI S': 6.9 CM/SEC
BILIRUB SERPL-MCNC: 0.8 MG/DL (ref 0–1.2)
BUN SERPL-MCNC: 7 MG/DL (ref 6–20)
BUN/CREAT SERPL: 8.8 (ref 7–25)
CALCIUM SPEC-SCNC: 9.2 MG/DL (ref 8.6–10.5)
CHLORIDE SERPL-SCNC: 96 MMOL/L (ref 98–107)
CO2 SERPL-SCNC: 28 MMOL/L (ref 22–29)
CREAT SERPL-MCNC: 0.8 MG/DL (ref 0.57–1)
EGFRCR SERPLBLD CKD-EPI 2021: 86.1 ML/MIN/1.73
GLOBULIN UR ELPH-MCNC: 2.7 GM/DL
GLUCOSE SERPL-MCNC: 106 MG/DL (ref 65–99)
LEFT ATRIUM VOLUME INDEX: 28 ML/M2
MAXIMAL PREDICTED HEART RATE: 163 BPM
POTASSIUM SERPL-SCNC: 3.9 MMOL/L (ref 3.5–5.2)
PROT SERPL-MCNC: 6.8 G/DL (ref 6–8.5)
SODIUM SERPL-SCNC: 140 MMOL/L (ref 136–145)
STRESS TARGET HR: 139 BPM

## 2023-04-20 PROCEDURE — G0378 HOSPITAL OBSERVATION PER HR: HCPCS

## 2023-04-20 PROCEDURE — 99232 SBSQ HOSP IP/OBS MODERATE 35: CPT | Performed by: NURSE PRACTITIONER

## 2023-04-20 PROCEDURE — 80053 COMPREHEN METABOLIC PANEL: CPT | Performed by: HOSPITALIST

## 2023-04-20 PROCEDURE — 93306 TTE W/DOPPLER COMPLETE: CPT | Performed by: INTERNAL MEDICINE

## 2023-04-20 PROCEDURE — 93306 TTE W/DOPPLER COMPLETE: CPT

## 2023-04-20 RX ORDER — ASPIRIN 81 MG/1
81 TABLET, CHEWABLE ORAL DAILY
Status: DISCONTINUED | OUTPATIENT
Start: 2023-04-21 | End: 2023-04-21 | Stop reason: HOSPADM

## 2023-04-20 RX ORDER — NIFEDIPINE 60 MG/1
60 TABLET, EXTENDED RELEASE ORAL
Status: DISCONTINUED | OUTPATIENT
Start: 2023-04-21 | End: 2023-04-21 | Stop reason: HOSPADM

## 2023-04-20 RX ORDER — NIFEDIPINE 30 MG/1
30 TABLET, FILM COATED, EXTENDED RELEASE ORAL
Qty: 30 TABLET | Refills: 0 | Status: CANCELLED | OUTPATIENT
Start: 2023-04-21

## 2023-04-20 RX ORDER — AMLODIPINE BESYLATE 5 MG/1
5 TABLET ORAL ONCE
Status: DISCONTINUED | OUTPATIENT
Start: 2023-04-20 | End: 2023-04-20

## 2023-04-20 RX ORDER — ASPIRIN 325 MG
325 TABLET ORAL DAILY
Qty: 30 TABLET | Refills: 0 | Status: CANCELLED | OUTPATIENT
Start: 2023-04-21

## 2023-04-20 RX ORDER — CLOPIDOGREL BISULFATE 75 MG/1
75 TABLET ORAL DAILY
Status: DISCONTINUED | OUTPATIENT
Start: 2023-04-20 | End: 2023-04-21 | Stop reason: HOSPADM

## 2023-04-20 RX ORDER — NIFEDIPINE 10 MG/1
10 CAPSULE ORAL ONCE
Status: COMPLETED | OUTPATIENT
Start: 2023-04-20 | End: 2023-04-20

## 2023-04-20 RX ORDER — NIFEDIPINE 30 MG/1
30 TABLET, EXTENDED RELEASE ORAL
Status: DISCONTINUED | OUTPATIENT
Start: 2023-04-20 | End: 2023-04-20

## 2023-04-20 RX ADMIN — PANTOPRAZOLE SODIUM 40 MG: 40 TABLET, DELAYED RELEASE ORAL at 05:17

## 2023-04-20 RX ADMIN — NIFEDIPINE 10 MG: 10 CAPSULE ORAL at 16:45

## 2023-04-20 RX ADMIN — Medication 12.5 MG: at 08:17

## 2023-04-20 RX ADMIN — Medication 10 ML: at 20:44

## 2023-04-20 RX ADMIN — ASPIRIN 325 MG: 325 TABLET ORAL at 08:16

## 2023-04-20 RX ADMIN — Medication 10 ML: at 08:21

## 2023-04-20 RX ADMIN — ESCITALOPRAM OXALATE 20 MG: 20 TABLET ORAL at 08:16

## 2023-04-20 RX ADMIN — NIFEDIPINE 30 MG: 30 TABLET, EXTENDED RELEASE ORAL at 13:15

## 2023-04-20 RX ADMIN — CLOPIDOGREL BISULFATE 75 MG: 75 TABLET ORAL at 15:58

## 2023-04-20 NOTE — PROGRESS NOTES
[facility] Medicine Services  PROGRESS NOTE    Patient Name: Radha Armstrong  : 1965  MRN: [id number]    Date of Admission: 2023  Primary Care Physician: Franci Vaughn MD    Subjective   Subjective     CC:  F/u right hand numbness    HPI:  Patient resting in bed. Says she is feeling better, has not had symptoms since Wednesday morning. Denies HA, blurry vision, numbness/tinging of right hand. Says she has not had a h/o HTN but has gained ~50 lbs since she quit smoking last year.    ROS:  Gen- No fevers, chills  CV- No chest pain, palpitations  Resp- No cough, dyspnea  GI- No N/V/D, abd pain       Objective   Objective     Vital Signs:   Temp:  [97.7 °F (36.5 °C)-98.2 °F (36.8 °C)] 97.9 °F (36.6 °C)  Heart Rate:  [63-82] 65  Resp:  [18] 18  BP: (141-171)/() 167/86     Physical Exam:  Constitutional: No acute distress, awake, alert  HENT: NCAT, mucous membranes moist  Respiratory: Clear to auscultation bilaterally, respiratory effort normal, room air   Cardiovascular: RRR, no murmurs, rubs, or gallops  Gastrointestinal: Positive bowel sounds, soft, nontender, nondistended  Musculoskeletal: Trace bilateral ankle edema  Psychiatric: Appropriate affect, cooperative  Neurologic: Oriented x 3, moves all extremities, no focal deficits, speech clear  Skin: No rashes    Results Reviewed:  LAB RESULTS:      Lab 23  0730 23  1519 23  1517   WBC 7.72 9.83  --    HEMOGLOBIN 13.1 14.6  --    HEMOGLOBIN, POC  --   --  15.3   HEMATOCRIT 40.1 43.5  --    HEMATOCRIT POC  --   --  45   PLATELETS 320 329  --    NEUTROS ABS  --  6.86  --    IMMATURE GRANS (ABS)  --  0.04  --    LYMPHS ABS  --  1.93  --    MONOS ABS  --  0.81  --    EOS ABS  --  0.13  --    .3* 98.9*  --    PROTIME  --  13.4  --    APTT  --  20.0*  --          Lab 23  0804 23  0730 23  1519 23  1517   SODIUM 140  --  140 142  --    POTASSIUM 3.9 3.6 3.5 3.3*  --     CHLORIDE 96*  --  96* 96*  --    CO2 28.0  --  30.0* 27.0  --    ANION GAP 16.0*  --  14.0 19.0*  --    BUN 7  --  9 7  --    CREATININE 0.80  --  0.75 0.76 0.70   EGFR 86.1  --  93.0 91.5 101.0   GLUCOSE 106*  --  88 125*  --    CALCIUM 9.2  --  8.8 8.7  --    MAGNESIUM  --   --   --  1.8  --    HEMOGLOBIN A1C  --   --  5.50  --   --          Lab 04/20/23  0804 04/19/23  0730 04/18/23  1519   TOTAL PROTEIN 6.8 6.3 7.2   ALBUMIN 4.1 3.9 3.9   GLOBULIN 2.7 2.4 3.3   ALT (SGPT) 119* 92* 107*   AST (SGOT) 142* 101* 124*   BILIRUBIN 0.8 0.8 0.6   ALK PHOS 211* 194* 213*   LIPASE  --   --  24         Lab 04/18/23  1519   HSTROP T <6   PROTIME 13.4   INR 1.1         Lab 04/19/23  0730   CHOLESTEROL 198   LDL CHOL 120*   HDL CHOL 60   TRIGLYCERIDES 99             Brief Urine Lab Results     None          Microbiology Results Abnormal     None          EEG    Result Date: 4/19/2023  Reason for referral: 57 y.o.female with shaking spells, consideration of seizures Technical Summary:  A 19 channel digital EEG was performed using the international 10-20 placement system, including eye leads and EKG leads. Duration: 20 minutes Findings: The awake tracing shows a medium amplitude well-regulated 10 Hz posterior rhythm present symmetrically over the occipital leads.  Low amplitude intermixed theta and alpha activity are seen anteriorly along with EMG and eye blink artifact.  Photic stimulation yields a symmetric driving response at several flash frequencies.  Stage II sleep is not seen.  Hyperventilation is not performed.  No focal features or epileptiform activity are seen. Video: Available Technical quality: Superior EKG: Regular, 70 bpm SUMMARY: Normal EEG in the awake state No focal features or epileptiform activity are seen     Impression: Normal study This report is transcribed using the Dragon dictation system.      CT Angiogram Neck    Result Date: 4/18/2023  CT ANGIOGRAM HEAD W AI ANALYSIS OF LVO, CT ANGIOGRAM NECK Date  of Exam: 4/18/2023 3:13 PM EDT Indication: Neuro deficit, acute stroke suspected Neuro deficit, acute stroke suspected. Comparison: None available. Technique: CTA of the head was performed after the uneventful intravenous administration of contrast . Reconstructed coronal and sagittal images were also obtained. In addition, a 3-D volume rendered image was created for interpretation. Automated exposure control and iterative reconstruction methods were used. Findings: The aortic arch is normal in caliber. There is a bovine configuration to the aortic arch. The right brachiocephalic artery is widely patent. The right common carotid artery is patent. There is mild plaquing in the right carotid bulb. The right ICA is widely patent throughout its course. The right subclavian artery, right vertebral artery are widely patent throughout their course. The left common carotid artery is widely patent. The left carotid bulb demonstrates mild plaquing. The left ICA is widely patent. The left subclavian and left vertebral artery are widely patent throughout their course. The intracranial vertebral arteries are widely patent. The basilar artery is widely patent. There is a diminutive left P1 segment with a large left posterior communicating artery, a normal variant. Bilateral PCAs are widely patent. Bilateral ACAs and bilateral MCAs are widely patent. Mild changes of emphysema. The thyroid is normal. No adenopathy is identified. Parotid and submandibular glands are unremarkable. Normal gray and white matter differentiation. Ventricles and sulci are age-appropriate. Orbits paranasal sinuses and mastoid  air cells are unremarkable. No aggressive appearing lytic or sclerotic bone lesions are identified.     Impression: Impression: 1. No intra or extracranial stenoses, aneurysms, or occlusions are identified. Electronically Signed: Bernabe Escobar  4/18/2023 3:35 PM EDT  Workstation ID: [id number]    MRI Brain Without Contrast    Result  Date: 4/20/2023  MRI BRAIN WO CONTRAST Date of Exam: 4/19/2023 10:33 PM EDT Indication: Stroke, follow up.  Comparison: None available. Technique:  Routine multiplanar/multisequence sequence images of the brain were obtained without contrast administration. Findings: No acute infarct is present on diffusion weighted sequences. Midline structures are normal and the craniocervical junction appears satisfactory. Age-related changes are present with some mild generalized volume loss. There is no evidence of intracranial hemorrhage, mass or mass effect. The ventricles are normal in size and configuration, accounting for mild surrounding volume loss. The orbits are normal. The paranasal sinuses are grossly clear. Intracranial arterial flow voids appear maintained.     Impression: Impression: Essentially normal noncontrast MRI of the brain for patient age. There is no evidence of acute infarct, hemorrhage, mass or mass effect. Electronically Signed: Louis Lobo  4/20/2023 6:00 AM EDT  Workstation ID: [id number]    US Gallbladder    Result Date: 4/19/2023  US GALLBLADDER Date of Exam: 4/19/2023 8:15 AM EDT Indication: elevated LFT's and NV. Comparison: CT abdomen pelvis 6/21/2014. Gallbladder ultrasound also 6/21/2014. Technique: Grayscale and color Doppler ultrasound evaluation of the right upper quadrant was performed. Findings: The liver is diffusely increased in echogenicity. There is coarsening of the liver echotexture and there is difficult sonographic penetration of the liver. There is no focal liver abnormality.  The portal vein and hepatic veins demonstrate normal directional flow and exhibit normal waveform on spectral imaging. The gallbladder is nondistended. There is no evidence of cholelithiasis. There is no evidence of acute cholecystitis. The common bile duct measures 5 mm diameter. The right kidney measures 8.7 x 4.8 x 6.0 cm. Kidney echogenicity and vascularity appear within normal limits.  There is no  solid kidney mass, echogenic shadowing stone or hydronephrosis. Limited visualization of the pancreas and aorta is unremarkable. There is no significant ascites within the right upper quadrant.     Impression: Impression: 1. Increased liver echogenicity with coarsening of the echotexture likely reflecting hepatic steatosis or other chronic hepatocellular disease. 2. No acute findings. Electronically Signed: Rufino Cuevas  4/19/2023 8:48 AM EDT  Workstation ID: [id number]    XR Chest 1 View    Result Date: 4/18/2023  XR CHEST 1 VW Date of Exam: 4/18/2023 3:31 PM EDT Indication: Acute Stroke Protocol (onset < 12 hrs). Comparison: 9/13/2018 Findings: Heart size and pulmonary vasculature within normal limits. Lungs clear. Costophrenic angles sharp     Impression: Impression: No active cardiopulmonary disease Electronically Signed: Bhavesh Toth  4/18/2023 3:40 PM EDT  Workstation ID: [id number]    CT Head Without Contrast Stroke Protocol    Result Date: 4/18/2023  CT HEAD WO CONTRAST STROKE PROTOCOL Date of Exam: 4/18/2023 3:08 PM EDT Indication: Neuro deficit, acute, stroke suspected Neuro deficit, acute stroke suspected. Comparison: None available. Technique: Axial CT images were obtained of the head without contrast administration.  Reconstructed coronal and sagittal images were also obtained. Automated exposure control and iterative construction methods were used. Scan Time: 3:08 p.m. Results discussed with stroke team at 3:12 p.m. Findings: Parenchyma:No acute intraparenchymal hemorrhage. No loss of gray-white differentiation to suggest large territory infarct. Normal parenchymal volume. No substantial white matter disease. No midline shift or herniation. Ventricles and extra axial spaces:Normal caliber of ventricles and sulci. No extra axial fluid collection seen. Other:Orbits are grossly intact. Scattered paranasal sinus mucosal thickening. Mastoid air cells are clear. Calvarium is intact. No substantial intracranial  atherosclerotic calcification.     Impression: Impression: No evidence of acute intracranial hemorrhage or large territory infarct. Electronically Signed: Conrad Hwang  4/18/2023 3:21 PM EDT  Workstation ID: [id number]    CT Angiogram Head w AI Analysis of LVO    Result Date: 4/18/2023  CT ANGIOGRAM HEAD W AI ANALYSIS OF LVO, CT ANGIOGRAM NECK Date of Exam: 4/18/2023 3:13 PM EDT Indication: Neuro deficit, acute stroke suspected Neuro deficit, acute stroke suspected. Comparison: None available. Technique: CTA of the head was performed after the uneventful intravenous administration of contrast . Reconstructed coronal and sagittal images were also obtained. In addition, a 3-D volume rendered image was created for interpretation. Automated exposure control and iterative reconstruction methods were used. Findings: The aortic arch is normal in caliber. There is a bovine configuration to the aortic arch. The right brachiocephalic artery is widely patent. The right common carotid artery is patent. There is mild plaquing in the right carotid bulb. The right ICA is widely patent throughout its course. The right subclavian artery, right vertebral artery are widely patent throughout their course. The left common carotid artery is widely patent. The left carotid bulb demonstrates mild plaquing. The left ICA is widely patent. The left subclavian and left vertebral artery are widely patent throughout their course. The intracranial vertebral arteries are widely patent. The basilar artery is widely patent. There is a diminutive left P1 segment with a large left posterior communicating artery, a normal variant. Bilateral PCAs are widely patent. Bilateral ACAs and bilateral MCAs are widely patent. Mild changes of emphysema. The thyroid is normal. No adenopathy is identified. Parotid and submandibular glands are unremarkable. Normal gray and white matter differentiation. Ventricles and sulci are age-appropriate. Orbits paranasal  sinuses and mastoid  air cells are unremarkable. No aggressive appearing lytic or sclerotic bone lesions are identified.     Impression: Impression: 1. No intra or extracranial stenoses, aneurysms, or occlusions are identified. Electronically Signed: Bernabe Escobar  4/18/2023 3:35 PM EDT  Workstation ID: [id number]    CT CEREBRAL PERFUSION WITH & WITHOUT CONTRAST    Result Date: 4/18/2023  CT CEREBRAL PERFUSION W WO CONTRAST Date of Exam: 4/18/2023 3:13 PM EDT Indication: Neuro deficit, acute stroke suspected.  Comparison: CT head from earlier today Technique: Axial CT images of the brain were obtained prior to and after the administration of 50 mL Isovue-370. Core blood volume, core blood flow, mean transit time, and Tmax images were obtained utilizing the Rapid software protocol. A limited CT angiogram of the head was also performed to measure the blood vessel density. The radiation dose reduction device was turned on for each scan per the ALARA (As Low as Reasonably Achievable) protocol. Findings: The intracranial cerebral perfusion appears normal.     Impression: Impression: Examination appears within normal limits. Electronically Signed: Vicente Weiss  4/18/2023 3:29 PM EDT  Workstation ID: [id number]          Current medications:  Scheduled Meds:aspirin, 325 mg, Oral, Daily   Or  aspirin, 300 mg, Rectal, Daily  escitalopram, 20 mg, Oral, Daily  metoprolol tartrate, 12.5 mg, Oral, Q12H  pantoprazole, 40 mg, Oral, Q AM  sodium chloride, 10 mL, Intravenous, Q12H      Continuous Infusions:   PRN Meds:.•  acetaminophen **OR** acetaminophen **OR** acetaminophen  •  Calcium Replacement - Follow Nurse / BPA Driven Protocol  •  Magnesium Standard Dose Replacement - Follow Nurse / BPA Driven Protocol  •  ondansetron **OR** ondansetron  •  Phosphorus Replacement - Follow Nurse / BPA Driven Protocol  •  Potassium Replacement - Follow Nurse / BPA Driven Protocol  •  sodium chloride  •  sodium chloride    Assessment & Plan    Assessment & Plan     Active Hospital Problems    Diagnosis  POA   • **Numbness and tingling of right upper extremity [R20.0, R20.2]  Yes   • Hypokalemia [E87.6]  Yes   • GERD (gastroesophageal reflux disease) [K21.9]  Yes   • HLD (hyperlipidemia) [E78.5]  Yes   • Elevated blood-pressure reading without diagnosis of hypertension [R03.0]  Yes   • Elevated LFTs [R79.89]  Unknown      Resolved Hospital Problems   No resolved problems to display.        Brief Hospital Course to date:  Radha Armstrong is a 57 y.o. female with hx of HLD, anxiety, depression, and previous tobacco abuse who presented due to 2- week history of head pressure and episodes of whole-body shaking. She then woke up on 4/18/23 with right arm/hand numbness. BP elevated at 196/62 on arrival to the ER. She does not have a diagnosis of HTN. CT head, CT perfusion, CTA head/neck were negative. Admitted for further workup and management. Neuro-stroke team consulted.     This patient's problems and plans were partially entered by my partner and updated as appropriate by me 04/20/23.    Numbness and tingling of RUE  Transient neurologic symptoms   --CT head and CT perfusion negative  --CTA head/neck negative  --Stroke Neurology team following  --Possible TIA, stroke, or hypertensive etiology   --MRI brain negative for acute hemorrage, infarct, or mass effect   --Echo 56.6%, negative test but cannot r/o small amt of intracardiac shunting  --EEG negative for epileptiform activity  --, HbA1c 5.5%  --Continue ASA  -- Hold statin d/t elevated LFTs  --Allow normalization of BP, goal is < 130/80  --PT/OT/SLP evaluations completed, seems to be at baseline, no further therapy needs at this time      Elevated blood pressure without HTN diagnosis   --BPs running higher today, SBPs high 170s  --Change metoprolol to nifedipine XL, will start at 30 mg daily  --Add nifedipine 10  Mg x 1  --Increase nifedipine XL to 60 mg daily in am  --Patient will need to keep a  BP log and F/U with PCP closely for further monitoring and medication adjustments, discussed monitoring two times daily x 1 week; pt has PCP f/u already scheduled for Monday, 4/24     Elevated LFT's  Hepatic steatosis    --Last known normal labs in 2018  --RUQ ultrasound overall unremarkable, does show hepatic steatosis  --Negative acute hepatitis panel  --LFT's trending up today, continue karen hold statin  --Recheck CMP in am since pt is staying tonight  --Recommend close PCP follow up with repeat LFT's, may benefit from CT A&P, h/o abdominal surgery 2014 with Dr. Katz     Hypokalemia   --Replace per protocol     Anxiety  Depression  --Continue Lexapro     GERD  --Continue PPI       Expected Discharge Location and Transportation: Home, car  Expected Discharge pending SBP coming down  Expected Discharge Date and Time     Expected Discharge Date Expected Discharge Time    Apr 20, 2023            DVT prophylaxis:  Mechanical DVT prophylaxis orders are present.     AM-PAC 6 Clicks Score (PT): 24 (04/19/23 2004)    CODE STATUS:   Code Status and Medical Interventions:   Ordered at: 04/18/23 1812     Level Of Support Discussed With:    Patient     Code Status (Patient has no pulse and is not breathing):    CPR (Attempt to Resuscitate)     Medical Interventions (Patient has pulse or is breathing):    Full Support       Sagrario Mcknight, APRN  04/20/23

## 2023-04-20 NOTE — PLAN OF CARE
Goal Outcome Evaluation:               Pt VSS, RA, NSR on monitor. NIH-0. Had MRI done last night. Still waiting alonzo ECHO. Plans for possible D/C today. Will continue with POC.

## 2023-04-20 NOTE — PROGRESS NOTES
Neurology Note    Patient:  Radha Armstrong    YOB: 1965    REFERRING PHYSICIAN:  Dr. Chapman    CHIEF COMPLAINT:    Right hand numbness and arm heaviness    HISTORY OF PRESENT ILLNESS:   No adverse events overnight.  No acute distress noted.    Past Medical History:  Past Medical History:   Diagnosis Date   • Depression    • Hyperlipidemia        Past Surgical History:  Past Surgical History:   Procedure Laterality Date   • ABDOMINAL SURGERY     • TONSILLECTOMY         Social History:   Social History     Socioeconomic History   • Marital status:    Tobacco Use   • Smoking status: Former     Packs/day: 1.00     Years: 10.00     Pack years: 10.00     Types: Cigarettes   Vaping Use   • Vaping Use: Never used   Substance and Sexual Activity   • Alcohol use: Yes     Comment: 2 drinks/day, wine, bourbon, vodka   • Drug use: No        Family History:   Family History   Problem Relation Age of Onset   • Breast cancer Mother 65   • Ovarian cancer Neg Hx        Medications Prior to Admission:    Prior to Admission medications    Medication Sig Start Date End Date Taking? Authorizing Provider   atorvastatin (LIPITOR) 10 MG tablet Take 10 mg by mouth Daily.    ProviderTyrell MD   escitalopram (LEXAPRO) 20 MG tablet Take 20 mg by mouth Daily.    Provider, MD Tyrell   esomeprazole (nexIUM) 40 MG capsule Take 1 capsule by mouth Every Morning Before Breakfast.    Provider, MD Tyrell   ondansetron ODT (ZOFRAN-ODT) 4 MG disintegrating tablet Take 1 tablet by mouth Every 8 (Eight) Hours As Needed for Nausea or Vomiting. 9/13/18   Jay Childs MD       Allergies:  Patient has no known allergies.      Review of system  Review of Systems   HENT: Negative for trouble swallowing.    Musculoskeletal: Negative for gait problem.   Neurological: Negative for weakness, numbness and headaches.   All other systems reviewed and are negative.      Vitals:    04/20/23 1120   BP: 168/88   Pulse: 69   Resp:  20   Temp: 97.9 °F (36.6 °C)   SpO2: 98%       Physical exam  Physical Exam  Constitutional:       Appearance: She is well-developed.   Cardiovascular:      Rate and Rhythm: Normal rate and regular rhythm.   Pulmonary:      Effort: Pulmonary effort is normal.   Neurological:      Mental Status: She is alert and oriented to person, place, and time.      Deep Tendon Reflexes: Reflexes are normal and symmetric.   Psychiatric:         Behavior: Behavior normal.         Thought Content: Thought content normal.           Lab Results   Component Value Date    WBC 7.72 04/19/2023    HGB 13.1 04/19/2023    HCT 40.1 04/19/2023    .3 (H) 04/19/2023     04/19/2023     Lab Results   Component Value Date    GLUCOSE 106 (H) 04/20/2023    BUN 7 04/20/2023    CREATININE 0.80 04/20/2023    EGFRIFNONA 69 09/13/2018    BCR 8.8 04/20/2023    CO2 28.0 04/20/2023    CALCIUM 9.2 04/20/2023    ALBUMIN 4.1 04/20/2023     (H) 04/20/2023     (H) 04/20/2023     ECG 12 Lead QT Measurement (Order [id number])  Order-Level Documents:    Scan on 4/19/2023 1146 by [employer], [place]: ECG 12-LEAD         Author: -- Service: -- Author Type: --   Filed:  Date of Service:  Creation Time:    Status: (Other)   Test Reason : QT Measurement  Blood Pressure :   */*   mmHG  Vent. Rate :  77 BPM     Atrial Rate :  78 BPM     P-R Int :   * ms          QRS Dur :  84 ms      QT Int : 404 ms       P-R-T Axes :   *  50  -3 degrees     QTc Int : 457 ms     sinus rhythm   Nonspecific T wave abnormality  Abnormal ECG  When compared with ECG of 18-APR-2023 15:31, (Unconfirmed)  Junctional rhythm has replaced Sinus rhythm  Nonspecific T wave abnormality no longer evident in Anterolateral leads  QT has shortened  Confirmed by Lorenzo Manriquez ([id number]) on 4/19/2023 12:42:03 PM     Referred By:            Confirmed By: Lorenzo Manriquez        Signed    Electronically signed by Lorenzo Manriquez MD on 4/19/23 at 1242 EDT       Lipid Panel         4/19/2023    07:30   Lipid Panel   Total Cholesterol 198     Triglycerides 99     HDL Cholesterol 60     VLDL Cholesterol 18     LDL Cholesterol  120     LDL/HDL Ratio 1.97       Hemoglobin A1c: 5.5      Radiological Studies:  CT Angiogram Neck    Result Date: 4/18/2023  CT ANGIOGRAM HEAD W AI ANALYSIS OF LVO, CT ANGIOGRAM NECK Date of Exam: 4/18/2023 3:13 PM EDT Indication: Neuro deficit, acute stroke suspected Neuro deficit, acute stroke suspected. Comparison: None available. Technique: CTA of the head was performed after the uneventful intravenous administration of contrast . Reconstructed coronal and sagittal images were also obtained. In addition, a 3-D volume rendered image was created for interpretation. Automated exposure control and iterative reconstruction methods were used. Findings: The aortic arch is normal in caliber. There is a bovine configuration to the aortic arch. The right brachiocephalic artery is widely patent. The right common carotid artery is patent. There is mild plaquing in the right carotid bulb. The right ICA is widely patent throughout its course. The right subclavian artery, right vertebral artery are widely patent throughout their course. The left common carotid artery is widely patent. The left carotid bulb demonstrates mild plaquing. The left ICA is widely patent. The left subclavian and left vertebral artery are widely patent throughout their course. The intracranial vertebral arteries are widely patent. The basilar artery is widely patent. There is a diminutive left P1 segment with a large left posterior communicating artery, a normal variant. Bilateral PCAs are widely patent. Bilateral ACAs and bilateral MCAs are widely patent. Mild changes of emphysema. The thyroid is normal. No adenopathy is identified. Parotid and submandibular glands are unremarkable. Normal gray and white matter differentiation. Ventricles and sulci are age-appropriate. Orbits paranasal sinuses and mastoid   air cells are unremarkable. No aggressive appearing lytic or sclerotic bone lesions are identified.     Impression: 1. No intra or extracranial stenoses, aneurysms, or occlusions are identified. Electronically Signed: Bernabe Escobar  4/18/2023 3:35 PM EDT  Workstation ID: [id number]    US Gallbladder    Result Date: 4/19/2023  US GALLBLADDER Date of Exam: 4/19/2023 8:15 AM EDT Indication: elevated LFT's and NV. Comparison: CT abdomen pelvis 6/21/2014. Gallbladder ultrasound also 6/21/2014. Technique: Grayscale and color Doppler ultrasound evaluation of the right upper quadrant was performed. Findings: The liver is diffusely increased in echogenicity. There is coarsening of the liver echotexture and there is difficult sonographic penetration of the liver. There is no focal liver abnormality.  The portal vein and hepatic veins demonstrate normal directional flow and exhibit normal waveform on spectral imaging. The gallbladder is nondistended. There is no evidence of cholelithiasis. There is no evidence of acute cholecystitis. The common bile duct measures 5 mm diameter. The right kidney measures 8.7 x 4.8 x 6.0 cm. Kidney echogenicity and vascularity appear within normal limits.  There is no solid kidney mass, echogenic shadowing stone or hydronephrosis. Limited visualization of the pancreas and aorta is unremarkable. There is no significant ascites within the right upper quadrant.     Impression: 1. Increased liver echogenicity with coarsening of the echotexture likely reflecting hepatic steatosis or other chronic hepatocellular disease. 2. No acute findings. Electronically Signed: Rufino Cuevas  4/19/2023 8:48 AM EDT  Workstation ID: [id number]    XR Chest 1 View    Result Date: 4/18/2023  XR CHEST 1 VW Date of Exam: 4/18/2023 3:31 PM EDT Indication: Acute Stroke Protocol (onset < 12 hrs). Comparison: 9/13/2018 Findings: Heart size and pulmonary vasculature within normal limits. Lungs clear. Costophrenic angles sharp      Impression: No active cardiopulmonary disease Electronically Signed: Bhavesh Toth  4/18/2023 3:40 PM EDT  Workstation ID: [id number]    CT Head Without Contrast Stroke Protocol    Result Date: 4/18/2023  CT HEAD WO CONTRAST STROKE PROTOCOL Date of Exam: 4/18/2023 3:08 PM EDT Indication: Neuro deficit, acute, stroke suspected Neuro deficit, acute stroke suspected. Comparison: None available. Technique: Axial CT images were obtained of the head without contrast administration.  Reconstructed coronal and sagittal images were also obtained. Automated exposure control and iterative construction methods were used. Scan Time: 3:08 p.m. Results discussed with stroke team at 3:12 p.m. Findings: Parenchyma:No acute intraparenchymal hemorrhage. No loss of gray-white differentiation to suggest large territory infarct. Normal parenchymal volume. No substantial white matter disease. No midline shift or herniation. Ventricles and extra axial spaces:Normal caliber of ventricles and sulci. No extra axial fluid collection seen. Other:Orbits are grossly intact. Scattered paranasal sinus mucosal thickening. Mastoid air cells are clear. Calvarium is intact. No substantial intracranial atherosclerotic calcification.     Impression: No evidence of acute intracranial hemorrhage or large territory infarct. Electronically Signed: Conrad Hwang  4/18/2023 3:21 PM EDT  Workstation ID: [id number]    CT Angiogram Head w AI Analysis of LVO    Result Date: 4/18/2023  CT ANGIOGRAM HEAD W AI ANALYSIS OF LVO, CT ANGIOGRAM NECK Date of Exam: 4/18/2023 3:13 PM EDT Indication: Neuro deficit, acute stroke suspected Neuro deficit, acute stroke suspected. Comparison: None available. Technique: CTA of the head was performed after the uneventful intravenous administration of contrast . Reconstructed coronal and sagittal images were also obtained. In addition, a 3-D volume rendered image was created for interpretation. Automated exposure control and  iterative reconstruction methods were used. Findings: The aortic arch is normal in caliber. There is a bovine configuration to the aortic arch. The right brachiocephalic artery is widely patent. The right common carotid artery is patent. There is mild plaquing in the right carotid bulb. The right ICA is widely patent throughout its course. The right subclavian artery, right vertebral artery are widely patent throughout their course. The left common carotid artery is widely patent. The left carotid bulb demonstrates mild plaquing. The left ICA is widely patent. The left subclavian and left vertebral artery are widely patent throughout their course. The intracranial vertebral arteries are widely patent. The basilar artery is widely patent. There is a diminutive left P1 segment with a large left posterior communicating artery, a normal variant. Bilateral PCAs are widely patent. Bilateral ACAs and bilateral MCAs are widely patent. Mild changes of emphysema. The thyroid is normal. No adenopathy is identified. Parotid and submandibular glands are unremarkable. Normal gray and white matter differentiation. Ventricles and sulci are age-appropriate. Orbits paranasal sinuses and mastoid  air cells are unremarkable. No aggressive appearing lytic or sclerotic bone lesions are identified.     Impression: 1. No intra or extracranial stenoses, aneurysms, or occlusions are identified. Electronically Signed: Bernabe Escobar  4/18/2023 3:35 PM EDT  Workstation ID: [id number]    CT CEREBRAL PERFUSION WITH & WITHOUT CONTRAST    Result Date: 4/18/2023  CT CEREBRAL PERFUSION W WO CONTRAST Date of Exam: 4/18/2023 3:13 PM EDT Indication: Neuro deficit, acute stroke suspected.  Comparison: CT head from earlier today Technique: Axial CT images of the brain were obtained prior to and after the administration of 50 mL Isovue-370. Core blood volume, core blood flow, mean transit time, and Tmax images were obtained utilizing the Rapid software  "protocol. A limited CT angiogram of the head was also performed to measure the blood vessel density. The radiation dose reduction device was turned on for each scan per the ALARA (As Low as Reasonably Achievable) protocol. Findings: The intracranial cerebral perfusion appears normal.     Impression: Examination appears within normal limits. Electronically Signed: Vicente Abhishek  2023 3:29 PM EDT  Workstation ID: [id number]          During this visit the following were done:  Labs Reviewed [x]    Labs Ordered []    Radiology Reports Reviewed [x]    Radiology Ordered []    EKG, echo, and/or stress test reviewed [x]    EEG results reviewed  []    EEG reviewed and interpreted per myself   []    Discussed case with neurointerventionalist or neuroradiologist []    Referring Provider Records Reviewed []    ER Records Reviewed []    Hospital Records Reviewed []    History Obtained From Family []    Radiological images view and Interpreted per myself [x]    Case Discussed with referring provider []     Decision to obtain and request outside records  []        Assessment and Plan     57-year-old female with a PMH significant for HLD, depression, former tobacco abuse, and anxiety who presents to [facility] ED with complaints of episodes of transient head pressure, right hand numbness, \"all over shaking\", and coordination difficulties that have been occurring off and on for the past 2 weeks.      Strokelike symptoms  - Suspected TIA d/t   - CTA/CTP/CTA H/N unremarkable  - MRI brain negative for AIS  - TTE read with no LAE.  Negative saline test  - Start DAPT with aspirin 81 mg and Plavix 75 mg for 21 days followed by aspirin 325 mg monotherapy  - EEG with no evidence of epileptiform activity  - Headaches; fluctuating with associated tremor.  Suspect due to hypertension.  Symptoms improved with decrease BP.  - HTN; normalize BP goals.  BP goal less than 130/80  - HLD; .  Goal less than 70 for secondary TIA/stroke " prevention.  Unable to initiate statin therapy secondary to elevated LFTs.  We will repeat CMP and lipid panel at clinic follow-up.  Patient plans to discuss the possibility of Repatha when she sees her PCP on Monday.  - Elevated LFTs; hepatic steatosis.  Negative hepatitis panel.  Work-up per primary team.  - Patient is okay to discharge home from a neurology perspective  - Stroke clinic follow-up in 4 to 6 weeks  - Discussed the importance of medication compliance Plavix 75mg daily and Aspirin 81mg daily and lifestyle modifications adequate control of blood pressure, adequate control of cholesterol (goal LDL <70), increased physical activity and implementation of healthy diet to help reduce the risk of future cerebrovascular events.  Also discussed the signs symptoms that would warrant the patient return back to the emergency department including unilateral weakness, unilateral numbness, visual disturbances, loss of balance, speech difficulties, and/or a sudden severe headache.  Patient verbalized understanding.  - Plan discussed with the patient, Dr. Ball, primary team, and nursing staff. Stroke neurology will sign off.  Please call with any questions or concerns.    SHIN Pool, [facility] Center[facility]ID[facility]

## 2023-04-20 NOTE — DISCHARGE INSTRUCTIONS
-Start keeping a blood pressure log, and follow with PCP closely for further BP monitoring and medication adjustments.  -Start aspirin 81 mg and Plavix 75 mg daily for 21 days.  After 21 days, stop Plavix and aspirin 81 mg.  Start taking aspirin 325 mg daily.  -BP goals less than 130/80  -Increase activity as tolerated  -Return to the ED with any additional stroke symptoms

## 2023-04-21 ENCOUNTER — READMISSION MANAGEMENT (OUTPATIENT)
Dept: CALL CENTER | Facility: HOSPITAL | Age: 58
End: 2023-04-21
Payer: COMMERCIAL

## 2023-04-21 VITALS
RESPIRATION RATE: 16 BRPM | SYSTOLIC BLOOD PRESSURE: 149 MMHG | OXYGEN SATURATION: 99 % | WEIGHT: 214.95 LBS | DIASTOLIC BLOOD PRESSURE: 89 MMHG | HEIGHT: 65 IN | HEART RATE: 72 BPM | BODY MASS INDEX: 35.81 KG/M2 | TEMPERATURE: 98.1 F

## 2023-04-21 LAB
ALBUMIN SERPL-MCNC: 3.8 G/DL (ref 3.5–5.2)
ALBUMIN/GLOB SERPL: 1.5 G/DL
ALP SERPL-CCNC: 182 U/L (ref 39–117)
ALT SERPL W P-5'-P-CCNC: 98 U/L (ref 1–33)
ANION GAP SERPL CALCULATED.3IONS-SCNC: 13 MMOL/L (ref 5–15)
AST SERPL-CCNC: 98 U/L (ref 1–32)
BILIRUB SERPL-MCNC: 0.6 MG/DL (ref 0–1.2)
BUN SERPL-MCNC: 6 MG/DL (ref 6–20)
BUN/CREAT SERPL: 8.8 (ref 7–25)
CALCIUM SPEC-SCNC: 9.1 MG/DL (ref 8.6–10.5)
CHLORIDE SERPL-SCNC: 97 MMOL/L (ref 98–107)
CO2 SERPL-SCNC: 28 MMOL/L (ref 22–29)
CREAT SERPL-MCNC: 0.68 MG/DL (ref 0.57–1)
EGFRCR SERPLBLD CKD-EPI 2021: 101.7 ML/MIN/1.73
GLOBULIN UR ELPH-MCNC: 2.6 GM/DL
GLUCOSE SERPL-MCNC: 147 MG/DL (ref 65–99)
POTASSIUM SERPL-SCNC: 3.9 MMOL/L (ref 3.5–5.2)
PROT SERPL-MCNC: 6.4 G/DL (ref 6–8.5)
SODIUM SERPL-SCNC: 138 MMOL/L (ref 136–145)

## 2023-04-21 PROCEDURE — G0378 HOSPITAL OBSERVATION PER HR: HCPCS

## 2023-04-21 PROCEDURE — 80053 COMPREHEN METABOLIC PANEL: CPT | Performed by: NURSE PRACTITIONER

## 2023-04-21 PROCEDURE — 99239 HOSP IP/OBS DSCHRG MGMT >30: CPT | Performed by: INTERNAL MEDICINE

## 2023-04-21 RX ORDER — ASPIRIN 325 MG
325 TABLET, DELAYED RELEASE (ENTERIC COATED) ORAL DAILY
Start: 2023-05-14

## 2023-04-21 RX ORDER — CLOPIDOGREL BISULFATE 75 MG/1
75 TABLET ORAL DAILY
Qty: 21 TABLET | Refills: 0 | Status: SHIPPED | OUTPATIENT
Start: 2023-04-22 | End: 2023-05-13

## 2023-04-21 RX ORDER — ASPIRIN 81 MG/1
81 TABLET, CHEWABLE ORAL DAILY
Qty: 21 TABLET | Refills: 0 | Status: SHIPPED | OUTPATIENT
Start: 2023-04-22 | End: 2023-05-13

## 2023-04-21 RX ORDER — NIFEDIPINE 60 MG/1
60 TABLET, FILM COATED, EXTENDED RELEASE ORAL
Qty: 30 TABLET | Refills: 0 | Status: SHIPPED | OUTPATIENT
Start: 2023-04-22

## 2023-04-21 RX ADMIN — PANTOPRAZOLE SODIUM 40 MG: 40 TABLET, DELAYED RELEASE ORAL at 05:49

## 2023-04-21 RX ADMIN — Medication 10 ML: at 08:10

## 2023-04-21 RX ADMIN — ASPIRIN 81 MG CHEWABLE TABLET 81 MG: 81 TABLET CHEWABLE at 08:10

## 2023-04-21 RX ADMIN — ESCITALOPRAM OXALATE 20 MG: 20 TABLET ORAL at 08:09

## 2023-04-21 RX ADMIN — NIFEDIPINE 60 MG: 60 TABLET, EXTENDED RELEASE ORAL at 08:10

## 2023-04-21 RX ADMIN — CLOPIDOGREL BISULFATE 75 MG: 75 TABLET ORAL at 08:09

## 2023-04-21 NOTE — DISCHARGE SUMMARY
[facility] Medicine Services  DISCHARGE SUMMARY    Patient Name: Radha Armstrong  : 1965  MRN: [id number]    Date of Admission: 2023  3:08 PM  Date of Discharge:  23  Primary Care Physician: Franci Vaughn MD    Consults     Date and Time Order Name Status Description    2023  2:52 PM Inpatient Neurology Consult Stroke Completed           Hospital Course     Presenting Problem:   Numbness and tingling of right upper extremity [R20.0, R20.2]    Active Hospital Problems    Diagnosis  POA   • **Transient ischemic attack (TIA) [G45.9]  Unknown   • GERD (gastroesophageal reflux disease) [K21.9]  Yes   • HLD (hyperlipidemia) [E78.5]  Yes   • Elevated blood-pressure reading without diagnosis of hypertension [R03.0]  Yes   • Elevated LFTs [R79.89]  Yes      Resolved Hospital Problems    Diagnosis Date Resolved POA   • Numbness and tingling of right upper extremity [R20.0, R20.2] 2023 Yes   • Hypokalemia [E87.6] 2023 Yes          Hospital Course:  Radha Armstrong is a 57 y.o. female with hx of HLD, anxiety, depression, and previous tobacco abuse who presents due to 2 week history of head pressure and episodes of whole body shaking. She then woke up on 23 with right arm/hand numbness. BP elevated at 196/62 on arrival to the ER, she does not have a diagnosis of HTN. CT head, CT perfusion, CTA head/neck were negative. Admitted for further workup and management.     Numbness and tingling of RUE  Transient neurologic symptoms   --CT head and CT perfusion negative  --CTA head/neck negative  --MRI brain unremarkable  --Echo showed normal EF  --Stroke Neurology team followed  --Possible TIA  --DAPT x 21 days recommended, with conversion to  daily thereafter  --statin held due to LFTs, resume when possible  --follow up with Stroke Neurology in 4-6 weeks     Elevated blood pressure without HTN diagnosis   --nifedipine started for BP control  - follow up with PCP  next week with BP check    Elevated LFT's  Hepatic steatosis   Alcohol juse  --Last known normal labs in 2018  --Gallbladder ultrasound shows hepatic steatosis  --Negative acute hepatitis panel  --follow up with PCP next week with CMP  --follow up with primary gastroenterologist in 4-6 weeks  --advised no alcohol use -- patient says she has been drinking bourbon recently, but thinks she can abstain.  Denies need for counseling or addiction resources     Anxiety  Depression  --Continue Lexapro     GERD  --Continue PPI          Discharge Follow Up Recommendations for outpatient labs/diagnostics:   CMP with PCP next week at follow up    Day of Discharge     HPI:   Feels much better. No more numbness left arm.  Walking without difficulty. Would like to go home.    Review of Systems  Gen: no fever  Pulm: no cough  CV: no chest pain    Vital Signs:   Temp:  [97.7 °F (36.5 °C)-98.7 °F (37.1 °C)] 98.1 °F (36.7 °C)  Heart Rate:  [65-78] 72  Resp:  [16-20] 16  BP: (127-178)/(67-99) 149/89      Physical Exam:  Constitutional - no acute distress, nontoxic, in bed  HEENT-NCAT, mucous membranes moist  CV-RRR, S1 S2 normal, no m/r/g  Resp-CTAB, no wheezes, rhonchi or rales  Abd-soft, nontender, nondistended, normoactive bowel sounds  Ext-No lower extremity cyanosis, clubbing or edema bilaterally  Neuro-alert and oriented, speech clear, moves all extremities   Psych-normal affect   Skin- No rash on exposed UE or LE bilaterally      Pertinent  and/or Most Recent Results     LAB RESULTS:      Lab 04/19/23  0730 04/18/23  1519 04/18/23  1517   WBC 7.72 9.83  --    HEMOGLOBIN 13.1 14.6  --    HEMOGLOBIN, POC  --   --  15.3   HEMATOCRIT 40.1 43.5  --    HEMATOCRIT POC  --   --  45   PLATELETS 320 329  --    NEUTROS ABS  --  6.86  --    IMMATURE GRANS (ABS)  --  0.04  --    LYMPHS ABS  --  1.93  --    MONOS ABS  --  0.81  --    EOS ABS  --  0.13  --    .3* 98.9*  --    PROTIME  --  13.4  --    APTT  --  20.0*  --          Lab  04/20/23  0804 04/19/23 2003 04/19/23  0730 04/18/23  1519 04/18/23  1517   SODIUM 140  --  140 142  --    POTASSIUM 3.9 3.6 3.5 3.3*  --    CHLORIDE 96*  --  96* 96*  --    CO2 28.0  --  30.0* 27.0  --    ANION GAP 16.0*  --  14.0 19.0*  --    BUN 7  --  9 7  --    CREATININE 0.80  --  0.75 0.76 0.70   EGFR 86.1  --  93.0 91.5 101.0   GLUCOSE 106*  --  88 125*  --    CALCIUM 9.2  --  8.8 8.7  --    MAGNESIUM  --   --   --  1.8  --    HEMOGLOBIN A1C  --   --  5.50  --   --          Lab 04/20/23  0804 04/19/23  0730 04/18/23  1519   TOTAL PROTEIN 6.8 6.3 7.2   ALBUMIN 4.1 3.9 3.9   GLOBULIN 2.7 2.4 3.3   ALT (SGPT) 119* 92* 107*   AST (SGOT) 142* 101* 124*   BILIRUBIN 0.8 0.8 0.6   ALK PHOS 211* 194* 213*   LIPASE  --   --  24         Lab 04/18/23  1519   HSTROP T <6   PROTIME 13.4   INR 1.1         Lab 04/19/23  0730   CHOLESTEROL 198   LDL CHOL 120*   HDL CHOL 60   TRIGLYCERIDES 99             Brief Urine Lab Results     None        Microbiology Results (last 10 days)     ** No results found for the last 240 hours. **          Adult Transthoracic Echo Complete W/ Cont if Necessary Per Protocol (With Agitated Saline)    Result Date: 4/20/2023  •  Left ventricular systolic function is normal. Calculated left ventricular EF = 56.6% Normal left ventricular cavity size and wall thickness noted. All left ventricular wall segments contract normally. Left ventricular diastolic function was normal. •  Normal right ventricular cavity size and systolic function noted. •  Normal left atrial cavity size noted. Saline test results are negative however study was difficult and cannot rule out small amount of intercardiac shunting. •  The aortic valve is not well visualized. The aortic valve is grossly normal in structure. No significant aortic valve regurgitation is present. No hemodynamically significant aortic valve stenosis is present. •  Mild mitral valve regurgitation is present. No significant mitral valve stenosis is  present. •  Trace to mild tricuspid valve regurgitation is present. Estimated right ventricular systolic pressure from tricuspid regurgitation is normal (<35 mmHg).     EEG    Result Date: 4/19/2023  Reason for referral: 57 y.o.female with shaking spells, consideration of seizures Technical Summary:  A 19 channel digital EEG was performed using the international 10-20 placement system, including eye leads and EKG leads. Duration: 20 minutes Findings: The awake tracing shows a medium amplitude well-regulated 10 Hz posterior rhythm present symmetrically over the occipital leads.  Low amplitude intermixed theta and alpha activity are seen anteriorly along with EMG and eye blink artifact.  Photic stimulation yields a symmetric driving response at several flash frequencies.  Stage II sleep is not seen.  Hyperventilation is not performed.  No focal features or epileptiform activity are seen. Video: Available Technical quality: Superior EKG: Regular, 70 bpm SUMMARY: Normal EEG in the awake state No focal features or epileptiform activity are seen     Normal study This report is transcribed using the Dragon dictation system.      CT Angiogram Neck    Result Date: 4/18/2023  CT ANGIOGRAM HEAD W AI ANALYSIS OF LVO, CT ANGIOGRAM NECK Date of Exam: 4/18/2023 3:13 PM EDT Indication: Neuro deficit, acute stroke suspected Neuro deficit, acute stroke suspected. Comparison: None available. Technique: CTA of the head was performed after the uneventful intravenous administration of contrast . Reconstructed coronal and sagittal images were also obtained. In addition, a 3-D volume rendered image was created for interpretation. Automated exposure control and iterative reconstruction methods were used. Findings: The aortic arch is normal in caliber. There is a bovine configuration to the aortic arch. The right brachiocephalic artery is widely patent. The right common carotid artery is patent. There is mild plaquing in the right carotid  bulb. The right ICA is widely patent throughout its course. The right subclavian artery, right vertebral artery are widely patent throughout their course. The left common carotid artery is widely patent. The left carotid bulb demonstrates mild plaquing. The left ICA is widely patent. The left subclavian and left vertebral artery are widely patent throughout their course. The intracranial vertebral arteries are widely patent. The basilar artery is widely patent. There is a diminutive left P1 segment with a large left posterior communicating artery, a normal variant. Bilateral PCAs are widely patent. Bilateral ACAs and bilateral MCAs are widely patent. Mild changes of emphysema. The thyroid is normal. No adenopathy is identified. Parotid and submandibular glands are unremarkable. Normal gray and white matter differentiation. Ventricles and sulci are age-appropriate. Orbits paranasal sinuses and mastoid  air cells are unremarkable. No aggressive appearing lytic or sclerotic bone lesions are identified.     Impression: 1. No intra or extracranial stenoses, aneurysms, or occlusions are identified. Electronically Signed: Bernabe Escobar  4/18/2023 3:35 PM EDT  Workstation ID: [id number]    MRI Brain Without Contrast    Result Date: 4/20/2023  MRI BRAIN WO CONTRAST Date of Exam: 4/19/2023 10:33 PM EDT Indication: Stroke, follow up.  Comparison: None available. Technique:  Routine multiplanar/multisequence sequence images of the brain were obtained without contrast administration. Findings: No acute infarct is present on diffusion weighted sequences. Midline structures are normal and the craniocervical junction appears satisfactory. Age-related changes are present with some mild generalized volume loss. There is no evidence of intracranial hemorrhage, mass or mass effect. The ventricles are normal in size and configuration, accounting for mild surrounding volume loss. The orbits are normal. The paranasal sinuses are grossly  clear. Intracranial arterial flow voids appear maintained.     Impression: Essentially normal noncontrast MRI of the brain for patient age. There is no evidence of acute infarct, hemorrhage, mass or mass effect. Electronically Signed: Louis Lobo  4/20/2023 6:00 AM EDT  Workstation ID: [id number]    US Gallbladder    Result Date: 4/19/2023  US GALLBLADDER Date of Exam: 4/19/2023 8:15 AM EDT Indication: elevated LFT's and NV. Comparison: CT abdomen pelvis 6/21/2014. Gallbladder ultrasound also 6/21/2014. Technique: Grayscale and color Doppler ultrasound evaluation of the right upper quadrant was performed. Findings: The liver is diffusely increased in echogenicity. There is coarsening of the liver echotexture and there is difficult sonographic penetration of the liver. There is no focal liver abnormality.  The portal vein and hepatic veins demonstrate normal directional flow and exhibit normal waveform on spectral imaging. The gallbladder is nondistended. There is no evidence of cholelithiasis. There is no evidence of acute cholecystitis. The common bile duct measures 5 mm diameter. The right kidney measures 8.7 x 4.8 x 6.0 cm. Kidney echogenicity and vascularity appear within normal limits.  There is no solid kidney mass, echogenic shadowing stone or hydronephrosis. Limited visualization of the pancreas and aorta is unremarkable. There is no significant ascites within the right upper quadrant.     Impression: 1. Increased liver echogenicity with coarsening of the echotexture likely reflecting hepatic steatosis or other chronic hepatocellular disease. 2. No acute findings. Electronically Signed: Rufino Cuevas  4/19/2023 8:48 AM EDT  Workstation ID: [id number]    XR Chest 1 View    Result Date: 4/18/2023  XR CHEST 1 VW Date of Exam: 4/18/2023 3:31 PM EDT Indication: Acute Stroke Protocol (onset < 12 hrs). Comparison: 9/13/2018 Findings: Heart size and pulmonary vasculature within normal limits. Lungs clear. Costophrenic  angles sharp     Impression: No active cardiopulmonary disease Electronically Signed: Bhavesh Toth  4/18/2023 3:40 PM EDT  Workstation ID: [id number]    CT Head Without Contrast Stroke Protocol    Result Date: 4/18/2023  CT HEAD WO CONTRAST STROKE PROTOCOL Date of Exam: 4/18/2023 3:08 PM EDT Indication: Neuro deficit, acute, stroke suspected Neuro deficit, acute stroke suspected. Comparison: None available. Technique: Axial CT images were obtained of the head without contrast administration.  Reconstructed coronal and sagittal images were also obtained. Automated exposure control and iterative construction methods were used. Scan Time: 3:08 p.m. Results discussed with stroke team at 3:12 p.m. Findings: Parenchyma:No acute intraparenchymal hemorrhage. No loss of gray-white differentiation to suggest large territory infarct. Normal parenchymal volume. No substantial white matter disease. No midline shift or herniation. Ventricles and extra axial spaces:Normal caliber of ventricles and sulci. No extra axial fluid collection seen. Other:Orbits are grossly intact. Scattered paranasal sinus mucosal thickening. Mastoid air cells are clear. Calvarium is intact. No substantial intracranial atherosclerotic calcification.     Impression: No evidence of acute intracranial hemorrhage or large territory infarct. Electronically Signed: Conrad Hwang  4/18/2023 3:21 PM EDT  Workstation ID: [id number]    CT Angiogram Head w AI Analysis of LVO    Result Date: 4/18/2023  CT ANGIOGRAM HEAD W AI ANALYSIS OF LVO, CT ANGIOGRAM NECK Date of Exam: 4/18/2023 3:13 PM EDT Indication: Neuro deficit, acute stroke suspected Neuro deficit, acute stroke suspected. Comparison: None available. Technique: CTA of the head was performed after the uneventful intravenous administration of contrast . Reconstructed coronal and sagittal images were also obtained. In addition, a 3-D volume rendered image was created for interpretation. Automated exposure  control and iterative reconstruction methods were used. Findings: The aortic arch is normal in caliber. There is a bovine configuration to the aortic arch. The right brachiocephalic artery is widely patent. The right common carotid artery is patent. There is mild plaquing in the right carotid bulb. The right ICA is widely patent throughout its course. The right subclavian artery, right vertebral artery are widely patent throughout their course. The left common carotid artery is widely patent. The left carotid bulb demonstrates mild plaquing. The left ICA is widely patent. The left subclavian and left vertebral artery are widely patent throughout their course. The intracranial vertebral arteries are widely patent. The basilar artery is widely patent. There is a diminutive left P1 segment with a large left posterior communicating artery, a normal variant. Bilateral PCAs are widely patent. Bilateral ACAs and bilateral MCAs are widely patent. Mild changes of emphysema. The thyroid is normal. No adenopathy is identified. Parotid and submandibular glands are unremarkable. Normal gray and white matter differentiation. Ventricles and sulci are age-appropriate. Orbits paranasal sinuses and mastoid  air cells are unremarkable. No aggressive appearing lytic or sclerotic bone lesions are identified.     Impression: 1. No intra or extracranial stenoses, aneurysms, or occlusions are identified. Electronically Signed: Bernabe Escobar  4/18/2023 3:35 PM EDT  Workstation ID: [id number]    CT CEREBRAL PERFUSION WITH & WITHOUT CONTRAST    Result Date: 4/18/2023  CT CEREBRAL PERFUSION W WO CONTRAST Date of Exam: 4/18/2023 3:13 PM EDT Indication: Neuro deficit, acute stroke suspected.  Comparison: CT head from earlier today Technique: Axial CT images of the brain were obtained prior to and after the administration of 50 mL Isovue-370. Core blood volume, core blood flow, mean transit time, and Tmax images were obtained utilizing the Rapid  software protocol. A limited CT angiogram of the head was also performed to measure the blood vessel density. The radiation dose reduction device was turned on for each scan per the ALARA (As Low as Reasonably Achievable) protocol. Findings: The intracranial cerebral perfusion appears normal.     Impression: Examination appears within normal limits. Electronically Signed: Vicente Abhishek  4/18/2023 3:29 PM EDT  Workstation ID: [id number]              Results for orders placed during the hospital encounter of 04/18/23    Adult Transthoracic Echo Complete W/ Cont if Necessary Per Protocol (With Agitated Saline)    Interpretation Summary  •  Left ventricular systolic function is normal. Calculated left ventricular EF = 56.6% Normal left ventricular cavity size and wall thickness noted. All left ventricular wall segments contract normally. Left ventricular diastolic function was normal.  •  Normal right ventricular cavity size and systolic function noted.  •  Normal left atrial cavity size noted. Saline test results are negative however study was difficult and cannot rule out small amount of intercardiac shunting.  •  The aortic valve is not well visualized. The aortic valve is grossly normal in structure. No significant aortic valve regurgitation is present. No hemodynamically significant aortic valve stenosis is present.  •  Mild mitral valve regurgitation is present. No significant mitral valve stenosis is present.  •  Trace to mild tricuspid valve regurgitation is present. Estimated right ventricular systolic pressure from tricuspid regurgitation is normal (<35 mmHg).      Plan for Follow-up of Pending Labs/Results:   Pending Labs     Order Current Status    Comprehensive Metabolic Panel In process        Discharge Details        Discharge Medications      New Medications      Instructions Start Date   aspirin 81 MG chewable tablet   81 mg, Oral, Daily   Start Date: April 22, 2023     aspirin  MG tablet   325  mg, Oral, Daily, Begin full dose aspirin in 3 weeks, after completing 21 amber course of plavix (clopidogrel) and baby aspirin   Start Date: May 14, 2023     clopidogrel 75 MG tablet  Commonly known as: PLAVIX   75 mg, Oral, Daily   Start Date: April 22, 2023     NIFEdipine CC 60 MG 24 hr tablet  Commonly known as: ADALAT CC   60 mg, Oral, Every 24 Hours Scheduled   Start Date: April 22, 2023        Continue These Medications      Instructions Start Date   escitalopram 20 MG tablet  Commonly known as: LEXAPRO   20 mg, Oral, Daily      esomeprazole 40 MG capsule  Commonly known as: nexIUM   40 mg, Oral, Every Morning Before Breakfast      ondansetron ODT 4 MG disintegrating tablet  Commonly known as: ZOFRAN-ODT   4 mg, Oral, Every 8 Hours PRN         Stop These Medications    atorvastatin 10 MG tablet  Commonly known as: LIPITOR            No Known Allergies      Discharge Disposition:  Home or Self Care    Diet:  Hospital:  Diet Order   Procedures   • Diet: Cardiac Diets; Healthy Heart (2-3 Na+); Texture: Regular Texture (IDDSI 7); Fluid Consistency: Thin (IDDSI 0)       Activity:      Restrictions or Other Recommendations:         CODE STATUS:    Code Status and Medical Interventions:   Ordered at: 04/18/23 1812     Level Of Support Discussed With:    Patient     Code Status (Patient has no pulse and is not breathing):    CPR (Attempt to Resuscitate)     Medical Interventions (Patient has pulse or is breathing):    Full Support       No future appointments.    Additional Instructions for the Follow-ups that You Need to Schedule     Discharge Follow-up with PCP   As directed       Currently Documented PCP:    Franci Vaughn MD    PCP Phone Number:    [phone]     Follow Up Details: One week, repeat CMT to follow LFTs         Discharge Follow-up with PCP   As directed       Currently Documented PCP:    Franci Vaughn MD    PCP Phone Number:    [phone]     Follow Up Details: follow up with PCP next week as  scheduled with labs (CMP)         Discharge Follow-up with Specialty: follow up with [facility] in 4-6 weeks, elevated liver enzymes, hepatic steatosis   As directed      Specialty: follow up with [facility] in 4-6 weeks, elevated liver enzymes, hepatic steatosis         Discharge Follow-up with Specialty: follow up with Stroke Neurology in 4-6 weeks, TIA   As directed      Specialty: follow up with Stroke Neurology in 4-6 weeks, TIA         Discharge Follow-up with Specified Provider: [facility]roke [facility]; 1 Month   As directed      To: Neuro-Stroke Clinic    Follow Up: 1 Month                     Robbie Nixon MD  04/21/23      Time Spent on Discharge:  I spent  35 minutes on this discharge activity which included: face-to-face encounter with the patient, reviewing the data in the system, coordination of the care with the nursing staff as well as consultants, documentation, and entering orders.

## 2023-04-21 NOTE — DISCHARGE INSTR - LAB
You have a follow up appointment with [facility] scheduled for June 27th at 10:00am.

## 2023-04-21 NOTE — CASE MANAGEMENT/SOCIAL WORK
Case Management Discharge Note      Final Note: Patient will be discharged home today and family will transport.  No needs identified.         Selected Continued Care - Admitted Since 4/18/2023     Destination    No services have been selected for the patient.              Durable Medical Equipment    No services have been selected for the patient.              Dialysis/Infusion    No services have been selected for the patient.              Home Medical Care    No services have been selected for the patient.              Therapy    No services have been selected for the patient.              Community Resources    No services have been selected for the patient.              Community & DME    No services have been selected for the patient.                       Final Discharge Disposition Code: 01 - home or self-care

## 2023-04-22 NOTE — OUTREACH NOTE
Prep Survey    Flowsheet Row Responses   [facility] patient discharged from? [facility]   Is LACE score < 7 ? Yes   Eligibility Readm Mgmt   Discharge diagnosis TIA, Elevated blood pressure    Does the patient have one of the following disease processes/diagnoses(primary or secondary)? Stroke   Does the patient have Home health ordered? No   Is there a DME ordered? No   Prep survey completed? Yes          Christina YOUNG - Registered Nurse

## 2023-04-25 ENCOUNTER — READMISSION MANAGEMENT (OUTPATIENT)
Dept: CALL CENTER | Facility: HOSPITAL | Age: 58
End: 2023-04-25
Payer: COMMERCIAL

## 2023-04-25 NOTE — OUTREACH NOTE
Stroke Week 1 Survey    Flowsheet Row Responses   [facility] patient discharged from? [facility]   Does the patient have one of the following disease processes/diagnoses(primary or secondary)? Stroke   Week 1 attempt successful? Yes   Call start time 1032   Call end time 1034   Discharge diagnosis TIA, Elevated blood pressure    Is patient permission given to speak with other caregiver? Yes   List who call center can speak with Spouse   Person spoke with today (if not patient) and relationship Spouse   Meds reviewed with patient/caregiver? Yes   Is the patient having any side effects they believe may be caused by any medication additions or changes? No   Does the patient have all medications ordered at discharge? Yes   Is the patient taking all medications as directed (includes completed medication regime)? Yes   Does the patient have a primary care provider?  Yes   Does the patient have an appointment with their PCP within 7 days of discharge? Yes   Comments regarding PCP Has had PCP follow up   Has the patient kept scheduled appointments due by today? Yes   Has home health visited the patient within 72 hours of discharge? N/A   Psychosocial issues? No   Does the patient require any assistance with activities of daily living such as eating, bathing, dressing, walking, etc.? No   Does the patient have any residual symptoms from stroke/TIA? No   Did the patient receive a copy of their discharge instructions? Yes   Nursing interventions Reviewed instructions with patient   What is the patient's perception of their health status since discharge? Improving   Nursing interventions Nurse provided patient education   Is the patient/caregiver able to teach back the risk factors for a stroke? High blood pressure-goal below 120/80, High Cholesterol   Is the patient/caregiver able to teach back signs and symptoms related to disease process for when to call PCP? Yes   Is the patient/caregiver able to teach back signs and  symptoms related to disease process for when to call 911? Yes   If the patient is a current smoker, are they able to teach back resources for cessation? Not a smoker   Is the patient/caregiver able to teach back the hierarchy of who to call/visit for symptoms/problems? PCP, Specialist, Home health nurse, Urgent Care, ED, 911 Yes   Is the patient able to teach back FAST for Stroke? B alance: Watch for sudden loss of balance, E yes: Check for vision loss, F ace: Look for an uneven smile, A rm: Check if one arm is weak, S peech: Listen for slurred speech, T marybel: Call 9-1-1 right away   Week 1 call completed? Yes   Is the patient interested in additional calls from an ambulatory ?  NOTE:  applies to high risk patients requiring additional follow-up. Ngoc Bell T - Registered Nurse

## 2023-04-29 LAB
QT INTERVAL: 474 MS
QTC INTERVAL: 560 MS

## 2023-05-03 ENCOUNTER — READMISSION MANAGEMENT (OUTPATIENT)
Dept: CALL CENTER | Facility: HOSPITAL | Age: 58
End: 2023-05-03
Payer: COMMERCIAL

## 2023-05-03 NOTE — OUTREACH NOTE
Stroke Week 2 Survey    Flowsheet Row Responses   [facility]? [facility]   Does the patient have one of the following disease processes/diagnoses(primary or secondary)? Stroke   Week 2 attempt successful? No   Unsuccessful attempts Attempt 1          Keena NORRIS - Registered Nurse

## 2023-05-05 ENCOUNTER — READMISSION MANAGEMENT (OUTPATIENT)
Dept: CALL CENTER | Facility: HOSPITAL | Age: 58
End: 2023-05-05
Payer: COMMERCIAL

## 2023-05-05 NOTE — OUTREACH NOTE
Stroke Week 2 Survey    Flowsheet Row Responses   [facility] patient discharged from? [facility]   Does the patient have one of the following disease processes/diagnoses(primary or secondary)? Stroke   Week 2 attempt successful? Yes   Call start time 1620   Call end time 1623   Discharge diagnosis TIA, Elevated blood pressure    Person spoke with today (if not patient) and relationship Spouse   Meds reviewed with patient/caregiver? Yes   Is the patient taking all medications as directed (includes completed medication regime)? Yes   Does the patient have a primary care provider?  Yes   Does the patient have an appointment with their PCP within 7 days of discharge? Yes   Comments regarding PCP david Vaughn after discharge.   Has the patient kept scheduled appointments due by today? Yes   Psychosocial issues? No   Does the patient require any assistance with activities of daily living such as eating, bathing, dressing, walking, etc.? No   Does the patient have any residual symptoms from stroke/TIA? No   Does the patient understand the diet ordered at discharge? No   Did the patient receive a copy of their discharge instructions? Yes   Nursing interventions Reviewed instructions with patient   What is the patient's perception of their health status since discharge? Improving   Is the patient/caregiver able to teach back signs and symptoms related to disease process for when to call 911? Yes   Week 2 call completed? Yes   Is the patient interested in additional calls from an ambulatory ?  NOTE:  applies to high risk patients requiring additional follow-up. No   Wrap up additional comments spouse states patient is doing well. Denies needs/concerns.          RAMONA MAKI - Registered Nurse

## 2023-05-16 ENCOUNTER — READMISSION MANAGEMENT (OUTPATIENT)
Dept: CALL CENTER | Facility: HOSPITAL | Age: 58
End: 2023-05-16
Payer: COMMERCIAL

## 2023-05-16 NOTE — OUTREACH NOTE
Stroke Week 3 Survey    Flowsheet Row Responses   [facility] patient discharged from? [facility]   Does the patient have one of the following disease processes/diagnoses(primary or secondary)? Stroke   Week 3 attempt successful? Yes   Call start time 1453   Call end time 1454   Discharge diagnosis TIA, Elevated blood pressure    Is patient permission given to speak with other caregiver? Yes   List who call center can speak with Spouse   Person spoke with today (if not patient) and relationship Spouse   Meds reviewed with patient/caregiver? Yes   Is the patient taking all medications as directed (includes completed medication regime)? Yes   Does the patient have a primary care provider?  Yes   Has the patient kept scheduled appointments due by today? Yes  [PCP after discharge ]   Does the patient require any assistance with activities of daily living such as eating, bathing, dressing, walking, etc.? No   Does the patient have any residual symptoms from stroke/TIA? No   Does the patient understand the diet ordered at discharge? Yes   What is the patient's perception of their health status since discharge? Returned to baseline/stable   Is the patient able to teach back FAST for Stroke? B alance: Watch for sudden loss of balance, E yes: Check for vision loss, F ace: Look for an uneven smile   Is the patient/caregiver able to teach back the risk factors for a stroke? History of TIAs   Is the patient/caregiver able to teach back signs and symptoms related to disease process for when to call PCP? Yes   Is the patient/caregiver able to teach back signs and symptoms related to disease process for when to call 911? Yes   Is the patient/caregiver able to teach back the hierarchy of who to call/visit for symptoms/problems? PCP, Specialist, Home health nurse, Urgent Care, ED, 911 Yes   Week 3 call completed? Yes   Graduated/Revoked comments Spouse states pt is doing well-no issues or concerns during call          Monik H -  Registered Nurse

## 2023-06-09 ENCOUNTER — OFFICE VISIT (OUTPATIENT)
Dept: NEUROLOGY | Facility: CLINIC | Age: 58
End: 2023-06-09
Payer: COMMERCIAL

## 2023-06-09 VITALS
TEMPERATURE: 98.4 F | SYSTOLIC BLOOD PRESSURE: 132 MMHG | BODY MASS INDEX: 37.49 KG/M2 | HEIGHT: 65 IN | WEIGHT: 225 LBS | DIASTOLIC BLOOD PRESSURE: 82 MMHG | OXYGEN SATURATION: 98 % | HEART RATE: 96 BPM

## 2023-06-09 DIAGNOSIS — Z86.73 HISTORY OF TIA (TRANSIENT ISCHEMIC ATTACK): ICD-10-CM

## 2023-06-09 DIAGNOSIS — I10 HYPERTENSION, UNSPECIFIED TYPE: Primary | ICD-10-CM

## 2023-06-09 DIAGNOSIS — E78.5 HYPERLIPIDEMIA, UNSPECIFIED HYPERLIPIDEMIA TYPE: ICD-10-CM

## 2023-06-09 RX ORDER — ASPIRIN 81 MG/1
81 TABLET ORAL DAILY
Qty: 150 TABLET | Refills: 2 | Status: SHIPPED | OUTPATIENT
Start: 2023-06-09 | End: 2024-06-08

## 2023-06-09 RX ORDER — HYDROXYZINE HYDROCHLORIDE 25 MG/1
25 TABLET, FILM COATED ORAL EVERY 4 HOURS PRN
COMMUNITY
Start: 2023-04-24

## 2023-06-09 NOTE — PROGRESS NOTES
New Patient Office Visit      Encounter Date: 2023   Patient Name: Radha Armstrong  : 1965   MRN: [id number]   PCP: Franci Vaughn MD    Chief Complaint:    Chief Complaint   Patient presents with   • Follow-up       History of Present Illness: Radha Armstrong is a 58 y.o. female who is here today to establish care.  Pertinent medical history includes HTN, HLD, and former tobacco abuse.  Patient was admitted to [facility] 2023 after she presented with transient sensation of head pressure, right hand numbness and total body tremor.  Initial NIHSS 1.  She underwent a extensive work-up which was essentially normal.  CT head was negative for acute abnormality.  CT perfusion negative, CTA head and neck was unrevealing.  MRI brain was negative for ischemia.  Echocardiogram showed a normal left atrium, negative bubble study.  EEG was also negative.  Symptoms were attributed to a possible TIA secondary to small vessel disease.  She was discharged home on aspirin 81 mg and Plavix 75 mg for 21 days followed by aspirin 325 mg daily.  She was not started on statin secondary to elevated LFTs.    Today's visit 2023: Since returning home patient denies having any additional episodes of tremor or right hand numbness.  She states she has had a few occasional episodes of what feels like increased pressure in her head this usually occurs when she stands up and resolves after few minutes.  She specifies that this is not painful.  She sometimes has associated dizziness, but it is not as severe as the episode she had on .  She denies any associated weakness, numbness, speech or visual changes.  She completed 21 days of DAPT and has continued on aspirin 325 mg and was started on Crestor 20 mg by her PCP.  Patient says that she believes that her LFTs were elevated secondary to alcohol use she tells me she had been drinking a couple drinks a day but she has not been drinking since her admission.  She has an upcoming GI  appointment at the end of the month.    Stroke Risk Factors: hyperlipidemia and hypertension      Subjective      Review of Systems:   Review of Systems   Constitutional: Negative for activity change and fever.   HENT: Negative for trouble swallowing.    Eyes: Negative for visual disturbance.   Respiratory: Negative for cough and shortness of breath.    Cardiovascular: Negative for chest pain and palpitations.   Gastrointestinal: Negative for nausea and vomiting.   Musculoskeletal: Negative.    Neurological: Positive for dizziness. Negative for speech difficulty, weakness, numbness and headache.   Psychiatric/Behavioral: Negative.        Past Medical History:   Past Medical History:   Diagnosis Date   • Depression    • Hyperlipidemia    • Hypertension 2023   • TIA (transient ischemic attack) 2023       Past Surgical History:   Past Surgical History:   Procedure Laterality Date   • ABDOMINAL SURGERY     • TONSILLECTOMY         Family History:   Family History   Problem Relation Age of Onset   • Breast cancer Mother 65   • Ovarian cancer Neg Hx        Social History:   Social History     Socioeconomic History   • Marital status:    Tobacco Use   • Smoking status: Former     Packs/day: 1.00     Years: 10.00     Pack years: 10.00     Types: Cigarettes     Quit date: 2022     Years since quittin.4     Passive exposure: Past   Vaping Use   • Vaping Use: Never used   Substance and Sexual Activity   • Alcohol use: Not Currently     Comment: 2 drinks/day, wine, bourbon, vodka   • Drug use: No       Medications:     Current Outpatient Medications:   •  escitalopram (LEXAPRO) 20 MG tablet, Take 1 tablet by mouth Daily., Disp: , Rfl:   •  esomeprazole (nexIUM) 40 MG capsule, Take 1 capsule by mouth Every Morning Before Breakfast., Disp: , Rfl:   •  hydrOXYzine (ATARAX) 25 MG tablet, Take 1 tablet by mouth Every 4 (Four) Hours As Needed., Disp: , Rfl:   •  NIFEdipine CC (ADALAT CC) 60 MG 24 hr tablet,  "Take 1 tablet by mouth Daily., Disp: 30 tablet, Rfl: 0  •  aspirin 81 MG EC tablet, Take 1 tablet by mouth Daily., Disp: 150 tablet, Rfl: 2    Allergies:   No Known Allergies    Objective     Physical Exam:  Vital Signs:   Vitals:    06/09/23 1105   BP: 132/82   Pulse: 96   Temp: 98.4 °F (36.9 °C)   SpO2: 98%   Weight: 102 kg (225 lb)   Height: 165.1 cm (65\")     Body mass index is 37.44 kg/m².     Physical Exam  Vitals reviewed.   Constitutional:       Appearance: Normal appearance.   HENT:      Head: Normocephalic and atraumatic.   Eyes:      General: Lids are normal.      Extraocular Movements: Extraocular movements intact.      Pupils: Pupils are equal, round, and reactive to light.   Cardiovascular:      Rate and Rhythm: Normal rate.   Pulmonary:      Effort: Pulmonary effort is normal. No respiratory distress.   Musculoskeletal:         General: No swelling. Normal range of motion.      Cervical back: Normal range of motion and neck supple.   Skin:     General: Skin is warm and dry.   Neurological:      General: No focal deficit present.      Mental Status: She is alert and oriented to person, place, and time. Mental status is at baseline.      Cranial Nerves: No cranial nerve deficit.      Sensory: No sensory deficit.      Motor: Motor strength is normal. No weakness.      Coordination: Coordination is intact.   Psychiatric:         Mood and Affect: Mood normal.         Speech: Speech normal.         Behavior: Behavior normal.       Neurological Exam  Mental Status  Awake, alert and oriented to person, place and time.Alert. Oriented to person, place, and time. Speech is normal. Language is fluent with no aphasia. Attention and concentration are normal.    Cranial Nerves  CN II: Visual fields full to confrontation.  CN III, IV, VI: Extraocular movements intact bilaterally. Normal lids and orbits bilaterally. Pupils equal round and reactive to light bilaterally.  CN V: Facial sensation is normal.  CN VII: Full " and symmetric facial movement.  CN XI: Shoulder shrug strength is normal.  CN XII: Tongue midline without atrophy or fasciculations.    Motor  Normal muscle bulk throughout. No fasciculations present. Normal muscle tone. No abnormal involuntary movements. Strength is 5/5 throughout all four extremities.    Sensory  Light touch is normal in upper and lower extremities.     Coordination    Finger-to-nose, rapid alternating movements and heel-to-shin normal bilaterally without dysmetria.    Gait  Casual gait is normal including stance, stride, and arm swing.       NIH Stroke Scale  Time: 17:26 EDT  Person Administering Scale: SHIN Barker    1a  Level of consciousness: 0=alert; keenly responsive   1b. LOC questions:  0=Answers both questions correctly   1c. LOC commands: 0=Performs both tasks correctly   2.  Best Gaze: 0=normal   3.  Visual: 0=No visual loss   4. Facial Palsy: 0=Normal symmetric movement   5a.  Motor left arm: 0=No drift, limb holds 90 (or 45) degrees for full 10 seconds   5b.  Motor right arm: 0=No drift, limb holds 90 (or 45) degrees for full 10 seconds   6a. motor left le=No drift, limb holds 90 (or 45) degrees for full 10 seconds   6b  Motor right le=No drift, limb holds 90 (or 45) degrees for full 10 seconds   7. Limb Ataxia: 0=Absent   8.  Sensory: 0=Normal; no sensory loss   9. Best Language:  0=No aphasia, normal   10. Dysarthria: 0=Normal   11. Extinction and Inattention: 0=No abnormality    Total:   0       Modified Fransisca Score:     MODIFIED FRANSISCA SCALE (to be assessed for each patient having history of stroke) []Stroke history but not assessed  [x]0: No symptoms at all  []1: No significant disability despite symptoms  []2: Slight disability  []3: Moderate disability  []4: Moderately severe disability  []5: Severe disability  []6: Death     PHQ-2 Depression Screening  Little interest or pleasure in doing things? 0-->not at all   Feeling down, depressed, or hopeless?  0-->not at all   PHQ-2 Total Score 0       Results Reviewed:   Labs  H&H 13.1/40.1  Platelets 320    A1c 5.5  AST 98  ALT 98  Negative acute hepatitis panel      Imaging    CT head was negative for acute abnormality.      CT perfusion negative    CTA head and neck was unrevealing.      MRI brain was negative for ischemia.      Echocardiogram showed a normal left atrium, negative bubble study.      EEG  negative.     Gallbladder ultrasound showed hepatic steatosis    Assessment / Plan      Assessment/Plan:   Diagnoses and all orders for this visit:    1.  History of transient ischemic attack (TIA)   -Question as to whether patient's episodes of increased pressure are related to blood pressure fluctuations; I have asked her to check her blood pressure at home when these occur  -Encouraged hydration  -Decrease aspirin to 81 mg  -Reviewed stroke risk factor navigation strategies    2. Hypertension, unspecified type  -Goal<130/80    3. Hyperlipidemia, unspecified hyperlipidemia type  -, goal <70  -Elevated LFTs  -Recommended that she stop Crestor until seen by GI, could consider Repatha if she is unable to tolerate statins  -Encouraged to continue to abstain from alcohol       Discussed the importance of medication compliance Aspirin 325mg daily and lifestyle modifications adequate control of blood pressure, adequate control of cholesterol (goal LDL <70), ETOH cessation, increased physical activity and implementation of healthy diet to help reduce the risk of future cerebrovascular events.  Also discussed the signs symptoms that would warrant the patient return back to the emergency department including unilateral weakness, unilateral numbness, visual disturbances, loss of balance, speech difficulties, and/or a sudden severe headache.  Patient verbalized understanding.      Follow Up:   Return in about 6 months (around 12/9/2023).    SHIN Barker  [facility] [place] [facility]

## 2023-10-31 ENCOUNTER — TRANSCRIBE ORDERS (OUTPATIENT)
Dept: ADMINISTRATIVE | Facility: HOSPITAL | Age: 58
End: 2023-10-31
Payer: COMMERCIAL

## 2023-10-31 DIAGNOSIS — Z12.31 ENCOUNTER FOR SCREENING MAMMOGRAM FOR BREAST CANCER: Primary | ICD-10-CM

## 2023-12-11 ENCOUNTER — DOCUMENTATION (OUTPATIENT)
Dept: NEUROLOGY | Facility: CLINIC | Age: 58
End: 2023-12-11
Payer: COMMERCIAL

## 2023-12-11 NOTE — PROGRESS NOTES
Called patient to answer her question about if she needs to continue ASA 81 mg. Call went to [facility]. Requested callback. Staff may relay message that yes she does need to continue her ASA